# Patient Record
Sex: FEMALE | Race: WHITE | NOT HISPANIC OR LATINO | URBAN - METROPOLITAN AREA
[De-identification: names, ages, dates, MRNs, and addresses within clinical notes are randomized per-mention and may not be internally consistent; named-entity substitution may affect disease eponyms.]

---

## 2019-06-16 ENCOUNTER — INPATIENT (INPATIENT)
Facility: HOSPITAL | Age: 34
LOS: 4 days | Discharge: HOME | End: 2019-06-21
Attending: OBSTETRICS & GYNECOLOGY | Admitting: OBSTETRICS & GYNECOLOGY
Payer: COMMERCIAL

## 2019-06-19 VITALS — WEIGHT: 169.76 LBS | HEIGHT: 63 IN

## 2019-06-19 DIAGNOSIS — O26.899 OTHER SPECIFIED PREGNANCY RELATED CONDITIONS, UNSPECIFIED TRIMESTER: ICD-10-CM

## 2019-06-19 DIAGNOSIS — Z3A.00 WEEKS OF GESTATION OF PREGNANCY NOT SPECIFIED: ICD-10-CM

## 2019-06-19 LAB
ALBUMIN SERPL ELPH-MCNC: 3.3 G/DL — SIGNIFICANT CHANGE UP (ref 3.3–5)
ALP SERPL-CCNC: 172 U/L — HIGH (ref 40–120)
ALT FLD-CCNC: <5 U/L — LOW (ref 10–45)
AMNISURE ROM (RUPTURE OF MEMBRANES): POSITIVE
ANION GAP SERPL CALC-SCNC: 13 MMOL/L — SIGNIFICANT CHANGE UP (ref 5–17)
APPEARANCE UR: ABNORMAL
APTT BLD: 27.7 SEC — SIGNIFICANT CHANGE UP (ref 27.5–36.3)
AST SERPL-CCNC: 19 U/L — SIGNIFICANT CHANGE UP (ref 10–40)
BASOPHILS # BLD AUTO: 0.07 K/UL — SIGNIFICANT CHANGE UP (ref 0–0.2)
BASOPHILS NFR BLD AUTO: 0.5 % — SIGNIFICANT CHANGE UP (ref 0–2)
BILIRUB SERPL-MCNC: 0.2 MG/DL — SIGNIFICANT CHANGE UP (ref 0.2–1.2)
BILIRUB UR-MCNC: NEGATIVE — SIGNIFICANT CHANGE UP
BUN SERPL-MCNC: 6 MG/DL — LOW (ref 7–23)
CALCIUM SERPL-MCNC: 9.6 MG/DL — SIGNIFICANT CHANGE UP (ref 8.4–10.5)
CHLORIDE SERPL-SCNC: 101 MMOL/L — SIGNIFICANT CHANGE UP (ref 96–108)
CO2 SERPL-SCNC: 20 MMOL/L — LOW (ref 22–31)
COLOR SPEC: YELLOW — SIGNIFICANT CHANGE UP
CREAT ?TM UR-MCNC: 159 MG/DL — SIGNIFICANT CHANGE UP
CREAT SERPL-MCNC: 0.56 MG/DL — SIGNIFICANT CHANGE UP (ref 0.5–1.3)
DIFF PNL FLD: ABNORMAL
EOSINOPHIL # BLD AUTO: 0.06 K/UL — SIGNIFICANT CHANGE UP (ref 0–0.5)
EOSINOPHIL NFR BLD AUTO: 0.4 % — SIGNIFICANT CHANGE UP (ref 0–6)
FIBRINOGEN PPP-MCNC: 460 MG/DL — HIGH (ref 258–438)
GLUCOSE SERPL-MCNC: 89 MG/DL — SIGNIFICANT CHANGE UP (ref 70–99)
GLUCOSE UR QL: NEGATIVE — SIGNIFICANT CHANGE UP
HCT VFR BLD CALC: 36.1 % — SIGNIFICANT CHANGE UP (ref 34.5–45)
HGB BLD-MCNC: 12 G/DL — SIGNIFICANT CHANGE UP (ref 11.5–15.5)
IMM GRANULOCYTES NFR BLD AUTO: 1.1 % — SIGNIFICANT CHANGE UP (ref 0–1.5)
INR BLD: 0.98 — SIGNIFICANT CHANGE UP (ref 0.88–1.16)
KETONES UR-MCNC: NEGATIVE — SIGNIFICANT CHANGE UP
LDH SERPL L TO P-CCNC: 161 U/L — SIGNIFICANT CHANGE UP (ref 50–242)
LEUKOCYTE ESTERASE UR-ACNC: NEGATIVE — SIGNIFICANT CHANGE UP
LYMPHOCYTES # BLD AUTO: 17.6 % — SIGNIFICANT CHANGE UP (ref 13–44)
LYMPHOCYTES # BLD AUTO: 2.4 K/UL — SIGNIFICANT CHANGE UP (ref 1–3.3)
MCHC RBC-ENTMCNC: 29.1 PG — SIGNIFICANT CHANGE UP (ref 27–34)
MCHC RBC-ENTMCNC: 33.2 GM/DL — SIGNIFICANT CHANGE UP (ref 32–36)
MCV RBC AUTO: 87.4 FL — SIGNIFICANT CHANGE UP (ref 80–100)
MONOCYTES # BLD AUTO: 0.8 K/UL — SIGNIFICANT CHANGE UP (ref 0–0.9)
MONOCYTES NFR BLD AUTO: 5.9 % — SIGNIFICANT CHANGE UP (ref 2–14)
NEUTROPHILS # BLD AUTO: 10.12 K/UL — HIGH (ref 1.8–7.4)
NEUTROPHILS NFR BLD AUTO: 74.5 % — SIGNIFICANT CHANGE UP (ref 43–77)
NITRITE UR-MCNC: NEGATIVE — SIGNIFICANT CHANGE UP
NRBC # BLD: 0 /100 WBCS — SIGNIFICANT CHANGE UP (ref 0–0)
PH UR: 6 — SIGNIFICANT CHANGE UP (ref 5–8)
PLATELET # BLD AUTO: 212 K/UL — SIGNIFICANT CHANGE UP (ref 150–400)
POTASSIUM SERPL-MCNC: 4.4 MMOL/L — SIGNIFICANT CHANGE UP (ref 3.5–5.3)
POTASSIUM SERPL-SCNC: 4.4 MMOL/L — SIGNIFICANT CHANGE UP (ref 3.5–5.3)
PROT ?TM UR-MCNC: 28 MG/DL — HIGH (ref 0–12)
PROT SERPL-MCNC: 6.6 G/DL — SIGNIFICANT CHANGE UP (ref 6–8.3)
PROT UR-MCNC: ABNORMAL MG/DL
PROT/CREAT UR-RTO: 0.2 RATIO — SIGNIFICANT CHANGE UP (ref 0–0.2)
PROTHROM AB SERPL-ACNC: 11.1 SEC — SIGNIFICANT CHANGE UP (ref 10–12.9)
RBC # BLD: 4.13 M/UL — SIGNIFICANT CHANGE UP (ref 3.8–5.2)
RBC # FLD: 13.2 % — SIGNIFICANT CHANGE UP (ref 10.3–14.5)
SODIUM SERPL-SCNC: 134 MMOL/L — LOW (ref 135–145)
SP GR SPEC: 1.02 — SIGNIFICANT CHANGE UP (ref 1–1.03)
T PALLIDUM AB TITR SER: NEGATIVE — SIGNIFICANT CHANGE UP
URATE SERPL-MCNC: 5.1 MG/DL — SIGNIFICANT CHANGE UP (ref 2.5–7)
UROBILINOGEN FLD QL: 0.2 E.U./DL — SIGNIFICANT CHANGE UP
WBC # BLD: 13.6 K/UL — HIGH (ref 3.8–10.5)
WBC # FLD AUTO: 13.6 K/UL — HIGH (ref 3.8–10.5)

## 2019-06-19 RX ORDER — OXYCODONE HYDROCHLORIDE 5 MG/1
5 TABLET ORAL
Refills: 0 | Status: DISCONTINUED | OUTPATIENT
Start: 2019-06-19 | End: 2019-06-21

## 2019-06-19 RX ORDER — OXYTOCIN 10 UNIT/ML
333.33 VIAL (ML) INJECTION
Qty: 20 | Refills: 0 | Status: DISCONTINUED | OUTPATIENT
Start: 2019-06-19 | End: 2019-06-19

## 2019-06-19 RX ORDER — SIMETHICONE 80 MG/1
80 TABLET, CHEWABLE ORAL EVERY 4 HOURS
Refills: 0 | Status: DISCONTINUED | OUTPATIENT
Start: 2019-06-19 | End: 2019-06-21

## 2019-06-19 RX ORDER — LANOLIN
1 OINTMENT (GRAM) TOPICAL EVERY 6 HOURS
Refills: 0 | Status: DISCONTINUED | OUTPATIENT
Start: 2019-06-19 | End: 2019-06-21

## 2019-06-19 RX ORDER — MAGNESIUM HYDROXIDE 400 MG/1
30 TABLET, CHEWABLE ORAL
Refills: 0 | Status: DISCONTINUED | OUTPATIENT
Start: 2019-06-19 | End: 2019-06-21

## 2019-06-19 RX ORDER — OXYTOCIN 10 UNIT/ML
333.33 VIAL (ML) INJECTION
Qty: 20 | Refills: 0 | Status: DISCONTINUED | OUTPATIENT
Start: 2019-06-19 | End: 2019-06-21

## 2019-06-19 RX ORDER — KETOROLAC TROMETHAMINE 30 MG/ML
30 SYRINGE (ML) INJECTION ONCE
Refills: 0 | Status: DISCONTINUED | OUTPATIENT
Start: 2019-06-19 | End: 2019-06-19

## 2019-06-19 RX ORDER — FENTANYL/BUPIVACAINE/NS/PF 2MCG/ML-.1
250 PLASTIC BAG, INJECTION (ML) INJECTION
Refills: 0 | Status: DISCONTINUED | OUTPATIENT
Start: 2019-06-19 | End: 2019-06-19

## 2019-06-19 RX ORDER — BENZOCAINE 10 %
1 GEL (GRAM) MUCOUS MEMBRANE EVERY 6 HOURS
Refills: 0 | Status: DISCONTINUED | OUTPATIENT
Start: 2019-06-19 | End: 2019-06-21

## 2019-06-19 RX ORDER — PRAMOXINE HYDROCHLORIDE 150 MG/15G
1 AEROSOL, FOAM RECTAL EVERY 4 HOURS
Refills: 0 | Status: DISCONTINUED | OUTPATIENT
Start: 2019-06-19 | End: 2019-06-21

## 2019-06-19 RX ORDER — OXYTOCIN 10 UNIT/ML
1 VIAL (ML) INJECTION
Qty: 30 | Refills: 0 | Status: DISCONTINUED | OUTPATIENT
Start: 2019-06-19 | End: 2019-06-19

## 2019-06-19 RX ORDER — DIBUCAINE 1 %
1 OINTMENT (GRAM) RECTAL EVERY 6 HOURS
Refills: 0 | Status: DISCONTINUED | OUTPATIENT
Start: 2019-06-19 | End: 2019-06-21

## 2019-06-19 RX ORDER — TETANUS TOXOID, REDUCED DIPHTHERIA TOXOID AND ACELLULAR PERTUSSIS VACCINE, ADSORBED 5; 2.5; 8; 8; 2.5 [IU]/.5ML; [IU]/.5ML; UG/.5ML; UG/.5ML; UG/.5ML
0.5 SUSPENSION INTRAMUSCULAR ONCE
Refills: 0 | Status: DISCONTINUED | OUTPATIENT
Start: 2019-06-19 | End: 2019-06-21

## 2019-06-19 RX ORDER — SODIUM CHLORIDE 9 MG/ML
3 INJECTION INTRAMUSCULAR; INTRAVENOUS; SUBCUTANEOUS EVERY 8 HOURS
Refills: 0 | Status: DISCONTINUED | OUTPATIENT
Start: 2019-06-19 | End: 2019-06-21

## 2019-06-19 RX ORDER — GLYCERIN ADULT
1 SUPPOSITORY, RECTAL RECTAL AT BEDTIME
Refills: 0 | Status: DISCONTINUED | OUTPATIENT
Start: 2019-06-19 | End: 2019-06-21

## 2019-06-19 RX ORDER — DOCUSATE SODIUM 100 MG
100 CAPSULE ORAL
Refills: 0 | Status: DISCONTINUED | OUTPATIENT
Start: 2019-06-19 | End: 2019-06-21

## 2019-06-19 RX ORDER — SODIUM CHLORIDE 9 MG/ML
1000 INJECTION, SOLUTION INTRAVENOUS
Refills: 0 | Status: DISCONTINUED | OUTPATIENT
Start: 2019-06-19 | End: 2019-06-19

## 2019-06-19 RX ORDER — HYDROCORTISONE 1 %
1 OINTMENT (GRAM) TOPICAL EVERY 6 HOURS
Refills: 0 | Status: DISCONTINUED | OUTPATIENT
Start: 2019-06-19 | End: 2019-06-21

## 2019-06-19 RX ORDER — ACETAMINOPHEN 500 MG
975 TABLET ORAL
Refills: 0 | Status: DISCONTINUED | OUTPATIENT
Start: 2019-06-19 | End: 2019-06-21

## 2019-06-19 RX ORDER — IBUPROFEN 200 MG
600 TABLET ORAL EVERY 6 HOURS
Refills: 0 | Status: COMPLETED | OUTPATIENT
Start: 2019-06-19 | End: 2020-05-17

## 2019-06-19 RX ORDER — AER TRAVELER 0.5 G/1
1 SOLUTION RECTAL; TOPICAL EVERY 4 HOURS
Refills: 0 | Status: DISCONTINUED | OUTPATIENT
Start: 2019-06-19 | End: 2019-06-21

## 2019-06-19 RX ORDER — ONDANSETRON 8 MG/1
4 TABLET, FILM COATED ORAL EVERY 6 HOURS
Refills: 0 | Status: DISCONTINUED | OUTPATIENT
Start: 2019-06-19 | End: 2019-06-19

## 2019-06-19 RX ORDER — DIPHENHYDRAMINE HCL 50 MG
25 CAPSULE ORAL EVERY 6 HOURS
Refills: 0 | Status: DISCONTINUED | OUTPATIENT
Start: 2019-06-19 | End: 2019-06-21

## 2019-06-19 RX ORDER — DEXAMETHASONE 0.5 MG/5ML
4 ELIXIR ORAL EVERY 6 HOURS
Refills: 0 | Status: DISCONTINUED | OUTPATIENT
Start: 2019-06-19 | End: 2019-06-19

## 2019-06-19 RX ORDER — NALOXONE HYDROCHLORIDE 4 MG/.1ML
0.1 SPRAY NASAL
Refills: 0 | Status: DISCONTINUED | OUTPATIENT
Start: 2019-06-19 | End: 2019-06-19

## 2019-06-19 RX ORDER — OXYCODONE HYDROCHLORIDE 5 MG/1
5 TABLET ORAL ONCE
Refills: 0 | Status: DISCONTINUED | OUTPATIENT
Start: 2019-06-19 | End: 2019-06-21

## 2019-06-19 RX ADMIN — Medication 975 MILLIGRAM(S): at 19:42

## 2019-06-19 RX ADMIN — Medication 30 MILLIGRAM(S): at 19:00

## 2019-06-19 RX ADMIN — SODIUM CHLORIDE 125 MILLILITER(S): 9 INJECTION, SOLUTION INTRAVENOUS at 05:22

## 2019-06-19 RX ADMIN — Medication 975 MILLIGRAM(S): at 16:30

## 2019-06-19 RX ADMIN — Medication 1 MILLIUNIT(S)/MIN: at 05:22

## 2019-06-19 RX ADMIN — Medication 30 MILLIGRAM(S): at 19:41

## 2019-06-19 RX ADMIN — SODIUM CHLORIDE 3 MILLILITER(S): 9 INJECTION INTRAMUSCULAR; INTRAVENOUS; SUBCUTANEOUS at 23:18

## 2019-06-20 LAB
ALBUMIN SERPL ELPH-MCNC: 2.5 G/DL — LOW (ref 3.3–5)
ALP SERPL-CCNC: 132 U/L — HIGH (ref 40–120)
ALT FLD-CCNC: 5 U/L — LOW (ref 10–45)
ANION GAP SERPL CALC-SCNC: 10 MMOL/L — SIGNIFICANT CHANGE UP (ref 5–17)
AST SERPL-CCNC: 34 U/L — SIGNIFICANT CHANGE UP (ref 10–40)
BILIRUB SERPL-MCNC: 0.2 MG/DL — SIGNIFICANT CHANGE UP (ref 0.2–1.2)
BUN SERPL-MCNC: 9 MG/DL — SIGNIFICANT CHANGE UP (ref 7–23)
CALCIUM SERPL-MCNC: 8.2 MG/DL — LOW (ref 8.4–10.5)
CHLORIDE SERPL-SCNC: 101 MMOL/L — SIGNIFICANT CHANGE UP (ref 96–108)
CO2 SERPL-SCNC: 22 MMOL/L — SIGNIFICANT CHANGE UP (ref 22–31)
CREAT SERPL-MCNC: 0.71 MG/DL — SIGNIFICANT CHANGE UP (ref 0.5–1.3)
GLUCOSE SERPL-MCNC: 84 MG/DL — SIGNIFICANT CHANGE UP (ref 70–99)
HCT VFR BLD CALC: 25.6 % — LOW (ref 34.5–45)
HGB BLD-MCNC: 8.6 G/DL — LOW (ref 11.5–15.5)
MCHC RBC-ENTMCNC: 29.4 PG — SIGNIFICANT CHANGE UP (ref 27–34)
MCHC RBC-ENTMCNC: 33.6 GM/DL — SIGNIFICANT CHANGE UP (ref 32–36)
MCV RBC AUTO: 87.4 FL — SIGNIFICANT CHANGE UP (ref 80–100)
NRBC # BLD: 0 /100 WBCS — SIGNIFICANT CHANGE UP (ref 0–0)
PLATELET # BLD AUTO: 162 K/UL — SIGNIFICANT CHANGE UP (ref 150–400)
POTASSIUM SERPL-MCNC: 4.2 MMOL/L — SIGNIFICANT CHANGE UP (ref 3.5–5.3)
POTASSIUM SERPL-SCNC: 4.2 MMOL/L — SIGNIFICANT CHANGE UP (ref 3.5–5.3)
PROT SERPL-MCNC: 5.1 G/DL — LOW (ref 6–8.3)
RBC # BLD: 2.93 M/UL — LOW (ref 3.8–5.2)
RBC # FLD: 13.1 % — SIGNIFICANT CHANGE UP (ref 10.3–14.5)
SODIUM SERPL-SCNC: 133 MMOL/L — LOW (ref 135–145)
WBC # BLD: 16.19 K/UL — HIGH (ref 3.8–10.5)
WBC # FLD AUTO: 16.19 K/UL — HIGH (ref 3.8–10.5)

## 2019-06-20 RX ORDER — IBUPROFEN 200 MG
600 TABLET ORAL EVERY 6 HOURS
Refills: 0 | Status: DISCONTINUED | OUTPATIENT
Start: 2019-06-20 | End: 2019-06-21

## 2019-06-20 RX ORDER — BENZOYL PEROXIDE MICRONIZED 5.8 %
0 TOWELETTE (EA) TOPICAL
Qty: 0 | Refills: 0 | DISCHARGE

## 2019-06-20 RX ADMIN — SODIUM CHLORIDE 3 MILLILITER(S): 9 INJECTION INTRAMUSCULAR; INTRAVENOUS; SUBCUTANEOUS at 14:08

## 2019-06-20 RX ADMIN — Medication 600 MILLIGRAM(S): at 06:51

## 2019-06-20 RX ADMIN — Medication 25 MILLIGRAM(S): at 00:02

## 2019-06-20 RX ADMIN — Medication 600 MILLIGRAM(S): at 11:47

## 2019-06-20 RX ADMIN — Medication 600 MILLIGRAM(S): at 12:40

## 2019-06-20 RX ADMIN — Medication 1 TABLET(S): at 11:47

## 2019-06-20 RX ADMIN — SODIUM CHLORIDE 3 MILLILITER(S): 9 INJECTION INTRAMUSCULAR; INTRAVENOUS; SUBCUTANEOUS at 21:06

## 2019-06-20 RX ADMIN — Medication 1 SPRAY(S): at 05:52

## 2019-06-20 RX ADMIN — Medication 975 MILLIGRAM(S): at 00:35

## 2019-06-20 RX ADMIN — Medication 100 MILLIGRAM(S): at 11:47

## 2019-06-20 RX ADMIN — Medication 600 MILLIGRAM(S): at 17:02

## 2019-06-20 RX ADMIN — Medication 975 MILLIGRAM(S): at 00:02

## 2019-06-20 RX ADMIN — Medication 1 APPLICATION(S): at 05:52

## 2019-06-20 RX ADMIN — Medication 1 APPLICATION(S): at 05:53

## 2019-06-20 RX ADMIN — Medication 975 MILLIGRAM(S): at 06:30

## 2019-06-20 RX ADMIN — SODIUM CHLORIDE 3 MILLILITER(S): 9 INJECTION INTRAMUSCULAR; INTRAVENOUS; SUBCUTANEOUS at 06:42

## 2019-06-20 RX ADMIN — Medication 975 MILLIGRAM(S): at 05:53

## 2019-06-20 RX ADMIN — Medication 600 MILLIGRAM(S): at 07:35

## 2019-06-20 RX ADMIN — Medication 600 MILLIGRAM(S): at 18:02

## 2019-06-20 NOTE — PROGRESS NOTE ADULT - SUBJECTIVE AND OBJECTIVE BOX
Patient evaluated at bedside.   She reports pain is well controlled.    She has been ambulating without assistance, voiding spontaneously, and is breastfeeding.    She denies HA, changes in vision, dizziness, chest pain, palpitations, shortness of breathe, n/v, heavy vaginal bleeding or perineal discomfort.    Physical Exam:  Vital Signs Last 24 Hrs  T(C): 37.3 (20 Jun 2019 04:01), Max: 37.9 (19 Jun 2019 19:45)  T(F): 99.2 (20 Jun 2019 04:01), Max: 100.3 (19 Jun 2019 19:45)  HR: 95 (20 Jun 2019 02:00) (88 - 113)  BP: 123/82 (20 Jun 2019 02:00) (109/75 - 140/77)  BP(mean): 87 (19 Jun 2019 20:45) (87 - 87)  RR: 18 (20 Jun 2019 02:00) (17 - 19)  SpO2: 96% (20 Jun 2019 02:00) (96% - 99%)    GA: NAD, A+0 x 3  Abd: + BS, soft, nontender, nondistended, no rebound or guarding, uterus firm at midline  : lochia WNL  Extremities: no swelling or calf tenderness                          8.6    16.19 )-----------( 162      ( 20 Jun 2019 05:45 )             25.6     06-20    133<L>  |  101  |  9   ----------------------------<  84  4.2   |  22  |  0.71    Ca    8.2<L>      20 Jun 2019 05:44    TPro  5.1<L>  /  Alb  2.5<L>  /  TBili  0.2  /  DBili  x   /  AST  34  /  ALT  5<L>  /  AlkPhos  132<H>  06-20    PT/INR - ( 19 Jun 2019 05:35 )   PT: 11.1 sec;   INR: 0.98          PTT - ( 19 Jun 2019 05:35 )  PTT:27.7 sec

## 2019-06-20 NOTE — DISCHARGE NOTE OB - CARE PROVIDER_API CALL
Layla Barrientos)  Obstetrics and Gynecology  [address], [address]  [place] [address]  Phone: [phone]  Fax: [phone]  Follow Up Time:

## 2019-06-20 NOTE — DISCHARGE NOTE OB - PATIENT PORTAL LINK FT
You can access the [facility] Patient Portal, offered by [facility], by registering with the following website: [email or web address]

## 2019-06-20 NOTE — LACTATION INITIAL EVALUATION - NS LACT CON REASON FOR REQ
39 week gestation baby boy, seen a little less than 24 hrs of life. Baby has been voiding and stooling, but baby was supplemented with formula as he had not yet latched to breast despite attempts. Mother had a PPH s/p c/s delivery and was  from the baby for a few hours. Implications of this were explained in terms of potential for delayed onset of lactogenesis stage 2. Hand expression was taught to the mother with proper return demo. Colostrum expressible in very small amounts, and drops offered to baby. Mother with a history of bilateral breast reduction surgery with keyhole incision, performed about 10 years ago, and mother confirms good sensation to the nipple and areola, and confirms breast changes noted in pregnancy. Implications of this type of surgery were explained to the mother, and mother is aware of possibility of insufficient milk supply, but this is to be determined as milk 'comes in' more fully after a couple of weeks. Reassurance and support provided. Breast pump to be brought and set up at bedside and instructions for use to be taught by bedside RN. Milk production feedback system explained and mother to double pump or hand express for ~15 minutes after every breastfeed session for stimulation and supplementation purposes. 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 Implications of this also explained to the mother, support provided. She will speak with pediatrician for further advice and recommendations as needed. Baby placed skin to skin and positioning and latch strategies were taught to the mother. Baby initially very sleepy and uninterested in feeding. After some stimulation efforts, baby deeply latched to the right breast and continues to feed effectively for 20+ minutes. Mother confirms a strong pull of the nipple and denies pinching. Continued SSC and rooming-in were encouraged. All questions were answered, mother verbalized understanding of teaching and info given./primaparous mom

## 2019-06-20 NOTE — PROGRESS NOTE ADULT - ASSESSMENT
A/P yo 34y  s/p FAVD, PP#1 ,  stable, am CBC stable 8.6    gHTN: normotensive to mild range overnight,. will continue to monitor   1. Pain: OPM  2. GI: Reg  3. :  Voiding  4. DVT prophylaxis: SCDs, ambulation  5. Dispo: PP#2

## 2019-06-21 VITALS
DIASTOLIC BLOOD PRESSURE: 79 MMHG | TEMPERATURE: 99 F | HEART RATE: 78 BPM | SYSTOLIC BLOOD PRESSURE: 118 MMHG | RESPIRATION RATE: 18 BRPM | OXYGEN SATURATION: 98 %

## 2019-06-21 PROCEDURE — 99214 OFFICE O/P EST MOD 30 MIN: CPT

## 2019-06-21 PROCEDURE — 84112 EVAL AMNIOTIC FLUID PROTEIN: CPT

## 2019-06-21 PROCEDURE — 85384 FIBRINOGEN ACTIVITY: CPT

## 2019-06-21 PROCEDURE — 84156 ASSAY OF PROTEIN URINE: CPT

## 2019-06-21 PROCEDURE — 85027 COMPLETE CBC AUTOMATED: CPT

## 2019-06-21 PROCEDURE — 36415 COLL VENOUS BLD VENIPUNCTURE: CPT

## 2019-06-21 PROCEDURE — 85025 COMPLETE CBC W/AUTO DIFF WBC: CPT

## 2019-06-21 PROCEDURE — 86900 BLOOD TYPING SEROLOGIC ABO: CPT

## 2019-06-21 PROCEDURE — 86901 BLOOD TYPING SEROLOGIC RH(D): CPT

## 2019-06-21 PROCEDURE — 82570 ASSAY OF URINE CREATININE: CPT

## 2019-06-21 PROCEDURE — 86780 TREPONEMA PALLIDUM: CPT

## 2019-06-21 PROCEDURE — 80053 COMPREHEN METABOLIC PANEL: CPT

## 2019-06-21 PROCEDURE — 85610 PROTHROMBIN TIME: CPT

## 2019-06-21 PROCEDURE — 86850 RBC ANTIBODY SCREEN: CPT

## 2019-06-21 PROCEDURE — 81001 URINALYSIS AUTO W/SCOPE: CPT

## 2019-06-21 PROCEDURE — 84550 ASSAY OF BLOOD/URIC ACID: CPT

## 2019-06-21 PROCEDURE — 83615 LACTATE (LD) (LDH) ENZYME: CPT

## 2019-06-21 PROCEDURE — 85730 THROMBOPLASTIN TIME PARTIAL: CPT

## 2019-06-21 RX ADMIN — Medication 1 TABLET(S): at 11:55

## 2019-06-21 RX ADMIN — Medication 600 MILLIGRAM(S): at 00:50

## 2019-06-21 RX ADMIN — Medication 600 MILLIGRAM(S): at 07:36

## 2019-06-21 RX ADMIN — Medication 975 MILLIGRAM(S): at 08:47

## 2019-06-21 RX ADMIN — SODIUM CHLORIDE 3 MILLILITER(S): 9 INJECTION INTRAMUSCULAR; INTRAVENOUS; SUBCUTANEOUS at 04:52

## 2019-06-21 RX ADMIN — Medication 975 MILLIGRAM(S): at 09:40

## 2019-06-21 RX ADMIN — Medication 600 MILLIGRAM(S): at 00:10

## 2019-06-21 RX ADMIN — Medication 600 MILLIGRAM(S): at 06:49

## 2019-06-21 RX ADMIN — Medication 100 MILLIGRAM(S): at 00:10

## 2019-06-21 RX ADMIN — Medication 600 MILLIGRAM(S): at 11:55

## 2019-06-21 NOTE — PROGRESS NOTE ADULT - ASSESSMENT
A/P yo 34y  s/p FAVD , PP#2 , stable    1. cHTN  -bp's stable  2. EBL 500cc  -s/p cytotec  -bleeding stable   1. Pain: OPM  2. GI: Reg  3. :  Voiding  4. DVT prophylaxis: SCDs, ambulation  5. Dispo: PP#2

## 2019-06-21 NOTE — PROGRESS NOTE ADULT - ATTENDING COMMENTS
Patient seen and examined, agree with assessment and plan.  PPD s/p FAD, doing well, cleared for discharge home today.    Dr. Nieves, Attending

## 2019-06-21 NOTE — PROGRESS NOTE ADULT - SUBJECTIVE AND OBJECTIVE BOX
Patient evaluated at bedside.     She reports pain is well controlled.    She has been ambulating without assistance, voiding spontaneously, and is breastfeeding.    She denies HA, dizziness, chest pain, palpitations, shortness of breath, n/v, heavy vaginal bleeding or perineal discomfort.    Physical Exam:  Vital Signs Last 24 Hrs  T(C): 37 (21 Jun 2019 06:00), Max: 37 (20 Jun 2019 09:30)  T(F): 98.6 (21 Jun 2019 06:00), Max: 98.6 (20 Jun 2019 09:30)  HR: 95 (21 Jun 2019 06:00) (95 - 111)  BP: 113/76 (21 Jun 2019 06:00) (110/75 - 126/81)  BP(mean): --  RR: 18 (21 Jun 2019 06:00) (14 - 19)  SpO2: 98% (21 Jun 2019 06:00) (96% - 99%)    GA: NAD, A+0 x 3  Abd: + BS, soft, nontender, nondistended, no rebound or guarding, uterus firm at midline  : lochia WNL  Extremities: no edema or calf tenderness                          8.6    16.19 )-----------( 162      ( 20 Jun 2019 05:45 )             25.6     06-20    133<L>  |  101  |  9   ----------------------------<  84  4.2   |  22  |  0.71    Ca    8.2<L>      20 Jun 2019 05:44    TPro  5.1<L>  /  Alb  2.5<L>  /  TBili  0.2  /  DBili  x   /  AST  34  /  ALT  5<L>  /  AlkPhos  132<H>  06-20

## 2019-06-26 DIAGNOSIS — O42.92 FULL-TERM PREMATURE RUPTURE OF MEMBRANES, UNSPECIFIED AS TO LENGTH OF TIME BETWEEN RUPTURE AND ONSET OF LABOR: ICD-10-CM

## 2019-06-26 DIAGNOSIS — Z3A.39 39 WEEKS GESTATION OF PREGNANCY: ICD-10-CM

## 2021-05-20 ENCOUNTER — INPATIENT (INPATIENT)
Facility: HOSPITAL | Age: 36
LOS: 2 days | Discharge: HOME | End: 2021-05-23
Attending: OBSTETRICS & GYNECOLOGY | Admitting: OBSTETRICS & GYNECOLOGY
Payer: COMMERCIAL

## 2021-05-20 DIAGNOSIS — O26.899 OTHER SPECIFIED PREGNANCY RELATED CONDITIONS, UNSPECIFIED TRIMESTER: ICD-10-CM

## 2021-05-20 DIAGNOSIS — Z3A.00 WEEKS OF GESTATION OF PREGNANCY NOT SPECIFIED: ICD-10-CM

## 2021-05-20 LAB
BASOPHILS # BLD AUTO: 0.05 K/UL — SIGNIFICANT CHANGE UP (ref 0–0.2)
BASOPHILS NFR BLD AUTO: 0.4 % — SIGNIFICANT CHANGE UP (ref 0–2)
EOSINOPHIL # BLD AUTO: 0.06 K/UL — SIGNIFICANT CHANGE UP (ref 0–0.5)
EOSINOPHIL NFR BLD AUTO: 0.5 % — SIGNIFICANT CHANGE UP (ref 0–6)
HCT VFR BLD CALC: 33.7 % — LOW (ref 34.5–45)
HGB BLD-MCNC: 11.4 G/DL — LOW (ref 11.5–15.5)
IMM GRANULOCYTES NFR BLD AUTO: 1.5 % — SIGNIFICANT CHANGE UP (ref 0–1.5)
LYMPHOCYTES # BLD AUTO: 18.5 % — SIGNIFICANT CHANGE UP (ref 13–44)
LYMPHOCYTES # BLD AUTO: 2.15 K/UL — SIGNIFICANT CHANGE UP (ref 1–3.3)
MCHC RBC-ENTMCNC: 28.8 PG — SIGNIFICANT CHANGE UP (ref 27–34)
MCHC RBC-ENTMCNC: 33.8 GM/DL — SIGNIFICANT CHANGE UP (ref 32–36)
MCV RBC AUTO: 85.1 FL — SIGNIFICANT CHANGE UP (ref 80–100)
MONOCYTES # BLD AUTO: 0.74 K/UL — SIGNIFICANT CHANGE UP (ref 0–0.9)
MONOCYTES NFR BLD AUTO: 6.4 % — SIGNIFICANT CHANGE UP (ref 2–14)
NEUTROPHILS # BLD AUTO: 8.46 K/UL — HIGH (ref 1.8–7.4)
NEUTROPHILS NFR BLD AUTO: 72.7 % — SIGNIFICANT CHANGE UP (ref 43–77)
NRBC # BLD: 0 /100 WBCS — SIGNIFICANT CHANGE UP (ref 0–0)
PLATELET # BLD AUTO: 211 K/UL — SIGNIFICANT CHANGE UP (ref 150–400)
RBC # BLD: 3.96 M/UL — SIGNIFICANT CHANGE UP (ref 3.8–5.2)
RBC # FLD: 14 % — SIGNIFICANT CHANGE UP (ref 10.3–14.5)
WBC # BLD: 11.64 K/UL — HIGH (ref 3.8–10.5)
WBC # FLD AUTO: 11.64 K/UL — HIGH (ref 3.8–10.5)

## 2021-05-20 RX ORDER — OXYTOCIN 10 UNIT/ML
2 VIAL (ML) INJECTION
Qty: 30 | Refills: 0 | Status: DISCONTINUED | OUTPATIENT
Start: 2021-05-20 | End: 2021-05-21

## 2021-05-20 RX ORDER — OXYTOCIN 10 UNIT/ML
333.33 VIAL (ML) INJECTION
Qty: 20 | Refills: 0 | Status: DISCONTINUED | OUTPATIENT
Start: 2021-05-20 | End: 2021-05-21

## 2021-05-20 RX ORDER — SODIUM CHLORIDE 9 MG/ML
1000 INJECTION, SOLUTION INTRAVENOUS
Refills: 0 | Status: DISCONTINUED | OUTPATIENT
Start: 2021-05-20 | End: 2021-05-21

## 2021-05-20 RX ORDER — CITRIC ACID/SODIUM CITRATE 300-500 MG
15 SOLUTION, ORAL ORAL ONCE
Refills: 0 | Status: DISCONTINUED | OUTPATIENT
Start: 2021-05-20 | End: 2021-05-21

## 2021-05-21 VITALS — WEIGHT: 154.32 LBS | HEIGHT: 63 IN

## 2021-05-21 LAB
BLD GP AB SCN SERPL QL: NEGATIVE — SIGNIFICANT CHANGE UP
COVID-19 SPIKE DOMAIN AB INTERP: POSITIVE
COVID-19 SPIKE DOMAIN ANTIBODY RESULT: >250 U/ML — HIGH
RH IG SCN BLD-IMP: POSITIVE — SIGNIFICANT CHANGE UP
RH IG SCN BLD-IMP: POSITIVE — SIGNIFICANT CHANGE UP
SARS-COV-2 IGG+IGM SERPL QL IA: >250 U/ML — HIGH
SARS-COV-2 IGG+IGM SERPL QL IA: POSITIVE
T PALLIDUM AB TITR SER: NEGATIVE — SIGNIFICANT CHANGE UP

## 2021-05-21 PROCEDURE — 88307 TISSUE EXAM BY PATHOLOGIST: CPT | Mod: 26

## 2021-05-21 RX ORDER — FENTANYL/BUPIVACAINE/NS/PF 2MCG/ML-.1
250 PLASTIC BAG, INJECTION (ML) INJECTION
Refills: 0 | Status: DISCONTINUED | OUTPATIENT
Start: 2021-05-21 | End: 2021-05-21

## 2021-05-21 RX ORDER — IBUPROFEN 200 MG
600 TABLET ORAL EVERY 6 HOURS
Refills: 0 | Status: DISCONTINUED | OUTPATIENT
Start: 2021-05-21 | End: 2021-05-23

## 2021-05-21 RX ORDER — OXYTOCIN 10 UNIT/ML
6 VIAL (ML) INJECTION
Qty: 30 | Refills: 0 | Status: DISCONTINUED | OUTPATIENT
Start: 2021-05-21 | End: 2021-05-21

## 2021-05-21 RX ORDER — TETANUS TOXOID, REDUCED DIPHTHERIA TOXOID AND ACELLULAR PERTUSSIS VACCINE, ADSORBED 5; 2.5; 8; 8; 2.5 [IU]/.5ML; [IU]/.5ML; UG/.5ML; UG/.5ML; UG/.5ML
0.5 SUSPENSION INTRAMUSCULAR ONCE
Refills: 0 | Status: DISCONTINUED | OUTPATIENT
Start: 2021-05-21 | End: 2021-05-23

## 2021-05-21 RX ORDER — IBUPROFEN 200 MG
600 TABLET ORAL EVERY 6 HOURS
Refills: 0 | Status: COMPLETED | OUTPATIENT
Start: 2021-05-21 | End: 2022-04-19

## 2021-05-21 RX ORDER — ACETAMINOPHEN 500 MG
975 TABLET ORAL
Refills: 0 | Status: DISCONTINUED | OUTPATIENT
Start: 2021-05-21 | End: 2021-05-23

## 2021-05-21 RX ORDER — SIMETHICONE 80 MG/1
80 TABLET, CHEWABLE ORAL EVERY 4 HOURS
Refills: 0 | Status: DISCONTINUED | OUTPATIENT
Start: 2021-05-21 | End: 2021-05-23

## 2021-05-21 RX ORDER — DIPHENHYDRAMINE HCL 50 MG
25 CAPSULE ORAL EVERY 6 HOURS
Refills: 0 | Status: DISCONTINUED | OUTPATIENT
Start: 2021-05-21 | End: 2021-05-23

## 2021-05-21 RX ORDER — OXYCODONE HYDROCHLORIDE 5 MG/1
5 TABLET ORAL ONCE
Refills: 0 | Status: DISCONTINUED | OUTPATIENT
Start: 2021-05-21 | End: 2021-05-23

## 2021-05-21 RX ORDER — SODIUM CHLORIDE 9 MG/ML
3 INJECTION INTRAMUSCULAR; INTRAVENOUS; SUBCUTANEOUS EVERY 8 HOURS
Refills: 0 | Status: DISCONTINUED | OUTPATIENT
Start: 2021-05-21 | End: 2021-05-22

## 2021-05-21 RX ORDER — HYDROCORTISONE 1 %
1 OINTMENT (GRAM) TOPICAL EVERY 6 HOURS
Refills: 0 | Status: DISCONTINUED | OUTPATIENT
Start: 2021-05-21 | End: 2021-05-23

## 2021-05-21 RX ORDER — OXYTOCIN 10 UNIT/ML
333.33 VIAL (ML) INJECTION
Qty: 20 | Refills: 0 | Status: DISCONTINUED | OUTPATIENT
Start: 2021-05-21 | End: 2021-05-23

## 2021-05-21 RX ORDER — DIBUCAINE 1 %
1 OINTMENT (GRAM) RECTAL EVERY 6 HOURS
Refills: 0 | Status: DISCONTINUED | OUTPATIENT
Start: 2021-05-21 | End: 2021-05-23

## 2021-05-21 RX ORDER — LANOLIN
1 OINTMENT (GRAM) TOPICAL EVERY 6 HOURS
Refills: 0 | Status: DISCONTINUED | OUTPATIENT
Start: 2021-05-21 | End: 2021-05-23

## 2021-05-21 RX ORDER — MAGNESIUM HYDROXIDE 400 MG/1
30 TABLET, CHEWABLE ORAL
Refills: 0 | Status: DISCONTINUED | OUTPATIENT
Start: 2021-05-21 | End: 2021-05-23

## 2021-05-21 RX ORDER — KETOROLAC TROMETHAMINE 30 MG/ML
30 SYRINGE (ML) INJECTION ONCE
Refills: 0 | Status: DISCONTINUED | OUTPATIENT
Start: 2021-05-21 | End: 2021-05-21

## 2021-05-21 RX ORDER — PRAMOXINE HYDROCHLORIDE 150 MG/15G
1 AEROSOL, FOAM RECTAL EVERY 4 HOURS
Refills: 0 | Status: DISCONTINUED | OUTPATIENT
Start: 2021-05-21 | End: 2021-05-23

## 2021-05-21 RX ORDER — OXYCODONE HYDROCHLORIDE 5 MG/1
5 TABLET ORAL
Refills: 0 | Status: DISCONTINUED | OUTPATIENT
Start: 2021-05-21 | End: 2021-05-23

## 2021-05-21 RX ORDER — BENZOCAINE 10 %
1 GEL (GRAM) MUCOUS MEMBRANE EVERY 6 HOURS
Refills: 0 | Status: DISCONTINUED | OUTPATIENT
Start: 2021-05-21 | End: 2021-05-23

## 2021-05-21 RX ORDER — AER TRAVELER 0.5 G/1
1 SOLUTION RECTAL; TOPICAL EVERY 4 HOURS
Refills: 0 | Status: DISCONTINUED | OUTPATIENT
Start: 2021-05-21 | End: 2021-05-23

## 2021-05-21 RX ADMIN — Medication 600 MILLIGRAM(S): at 21:40

## 2021-05-21 RX ADMIN — Medication 2 MILLIUNIT(S)/MIN: at 01:23

## 2021-05-21 RX ADMIN — Medication 1000 MILLIUNIT(S)/MIN: at 08:49

## 2021-05-21 RX ADMIN — Medication 975 MILLIGRAM(S): at 17:28

## 2021-05-21 RX ADMIN — Medication 1 TABLET(S): at 12:04

## 2021-05-21 RX ADMIN — Medication 600 MILLIGRAM(S): at 20:40

## 2021-05-21 RX ADMIN — SODIUM CHLORIDE 125 MILLILITER(S): 9 INJECTION, SOLUTION INTRAVENOUS at 03:00

## 2021-05-21 RX ADMIN — SODIUM CHLORIDE 125 MILLILITER(S): 9 INJECTION, SOLUTION INTRAVENOUS at 00:00

## 2021-05-21 RX ADMIN — SODIUM CHLORIDE 3 MILLILITER(S): 9 INJECTION INTRAMUSCULAR; INTRAVENOUS; SUBCUTANEOUS at 14:27

## 2021-05-21 RX ADMIN — Medication 975 MILLIGRAM(S): at 13:15

## 2021-05-21 RX ADMIN — Medication 30 MILLIGRAM(S): at 09:30

## 2021-05-21 RX ADMIN — Medication 975 MILLIGRAM(S): at 12:15

## 2021-05-21 RX ADMIN — Medication 975 MILLIGRAM(S): at 18:00

## 2021-05-21 NOTE — PATIENT PROFILE OB - AS SC BRADEN MOBILITY
Worse past 2 wks, reassured no radicular symptoms on exam, increase Lyrica as discussed, con't Cymbalta, stop Flexeril d/t lack of benefit and try Skelaxin, referral to Pain mgt given, red flag symptoms reviewed - call asap if occur, re-eval in 6 wks, if not better T/C MRI of lumbar spine (4) no limitation

## 2021-05-21 NOTE — CHART NOTE - NSCHARTNOTEFT_GEN_A_CORE
Called to bedside for increased vaginal bleeding when patient attempted to void.  Patient laying in bed.  Per, RN, patient had a boggy uterus and aguayo catheter was placed.  UOP of 500cc clear urine.    Abd: uterus firm  : no obvious vaginal bleeding    Plan to leave aguayo in place until 5/22 AM    Dr. Nieves at bedside

## 2021-05-21 NOTE — PROVIDER CONTACT NOTE (CHANGE IN STATUS NOTIFICATION) - SITUATION
patient  DDay. Fundus boggy on admission. lochia moderate and trickling. uterus massaged.  pts vitals stable /70 HR 74.

## 2021-05-21 NOTE — PATIENT PROFILE OB - CAREGIVER ADDRESS
Initial Anesthesia Post-op Note    Patient: Connie Willams  Procedure(s) Performed: ARTHROPLASTY, SHOULDER, TOTAL, REVERSE - LEFT  Anesthesia type: General    Vitals Value Taken Time   Temp 97.8 10/21/20 1410   Pulse 68 10/21/20 1409   Resp 16 10/21/20 1410   SpO2 100 % 10/21/20 1409   /77 10/21/20 1410   Vitals shown include unvalidated device data.      Patient Location: PACU Phase 1  Post-op Vital Signs:stable  Level of Consciousness: sedated  Respiratory Status: spontaneous ventilation, oral airway and face mask  Cardiovascular stable  Hydration: euvolemic  Pain Management: well controlled  Handoff: Handoff to receiving clinician was performed and questions were answered Nausea: None  Airway Patency:oral airway  Post-op Assessment: no complications and patient tolerated procedure well with no complications      No complications documented.   same as patient

## 2021-05-22 RX ORDER — IBUPROFEN 200 MG
1 TABLET ORAL
Qty: 0 | Refills: 0 | DISCHARGE
Start: 2021-05-22

## 2021-05-22 RX ORDER — ACETAMINOPHEN 500 MG
3 TABLET ORAL
Qty: 0 | Refills: 0 | DISCHARGE
Start: 2021-05-22

## 2021-05-22 RX ADMIN — Medication 975 MILLIGRAM(S): at 12:21

## 2021-05-22 RX ADMIN — Medication 600 MILLIGRAM(S): at 15:24

## 2021-05-22 RX ADMIN — Medication 600 MILLIGRAM(S): at 21:40

## 2021-05-22 RX ADMIN — Medication 975 MILLIGRAM(S): at 18:18

## 2021-05-22 RX ADMIN — Medication 975 MILLIGRAM(S): at 13:00

## 2021-05-22 RX ADMIN — Medication 600 MILLIGRAM(S): at 02:13

## 2021-05-22 RX ADMIN — Medication 600 MILLIGRAM(S): at 20:39

## 2021-05-22 RX ADMIN — Medication 600 MILLIGRAM(S): at 09:36

## 2021-05-22 RX ADMIN — Medication 1 TABLET(S): at 12:20

## 2021-05-22 RX ADMIN — Medication 600 MILLIGRAM(S): at 10:30

## 2021-05-22 RX ADMIN — Medication 975 MILLIGRAM(S): at 19:07

## 2021-05-22 RX ADMIN — Medication 600 MILLIGRAM(S): at 16:10

## 2021-05-22 RX ADMIN — Medication 975 MILLIGRAM(S): at 07:00

## 2021-05-22 RX ADMIN — Medication 600 MILLIGRAM(S): at 03:15

## 2021-05-22 RX ADMIN — Medication 975 MILLIGRAM(S): at 05:58

## 2021-05-22 NOTE — DISCHARGE NOTE OB - CARE PROVIDER_API CALL
ROXY STEPHENSON  Obstetrics and Gynecology  [address]  [place] [address]  Phone: [phone]  Fax: [phone]  Follow Up Time:

## 2021-05-22 NOTE — PROGRESS NOTE ADULT - ASSESSMENT
Assessment/Plan    36y y.o. F now PPD#1 s/p . AfVSS. Patient is progressing toward all postpartum milestones. Pain is well-controlled on PO medications. Anticipate discharge today or tomorrow.    1. Pain  - Well-controlled on Motrin and Tylenol ATC    2. GI  - Tolerating regular diet without n/v  - Senna PRN    3.   - Andrew out this AM  - F/u TOV    4. DVT prophylaxis  - Encouraging ambulation    5. Dispo  - Anticipate dispo PPD#1        Angella López MD PGY1

## 2021-05-22 NOTE — LACTATION INITIAL EVALUATION - REQUESTED BY
36.3 wks maternal history of bilateral breast reduction . Mother states she gave EBM for 4-5 months with her previous child and reports that she was never able to latch her first baby,. She states that she pumped because she never produced any breast milk until about 10 days post delivery.. mOther states infant is not latching well to the right breast and hasn't latch onto the left breast at all. She is mostly formula feeding and has no expressible colostrum bilaterally.Infant is currently out of the room for car seat testing. Mother states she will begin pumping today q 3 hours including overnight until infantis effectively latching and lactogenesis II occurs.  Pt prefers to use her personal breast pump when offered to provide her with a hospital grade double electric pump. All questions answered and pt verbalized understanding instructions. Pt is aware of LC availability as needed. Follow up prn. primary RN updated on pt status./patient/staff

## 2021-05-22 NOTE — DISCHARGE NOTE OB - PATIENT PORTAL LINK FT
You can access the FollowMyHealth Patient Portal offered by [facility] by registering at the following website: [email or web address]. By joining [employer]’s FollowMyHealth portal, you will also be able to view your health information using other applications (apps) compatible with our system.

## 2021-05-22 NOTE — PROGRESS NOTE ADULT - SUBJECTIVE AND OBJECTIVE BOX
Patient is a 36y y.o. F now PPD #1 s/p .    NAEO. Patient was evaluated at bedside this AM. Pain is well-controlled with PO pain medications. She has been ambulating without difficulty. Andrew is in place. She endorses decreasing vaginal bleeding.    Vital Signs Last 24 Hrs  T(C): 36.7 (22 May 2021 05:59), Max: 36.8 (21 May 2021 18:00)  T(F): 98 (22 May 2021 05:59), Max: 98.2 (21 May 2021 18:00)  HR: 80 (22 May 2021 05:59) (74 - 99)  BP: 114/69 (22 May 2021 05:59) (100/71 - 116/79)  BP(mean): --  RR: 17 (22 May 2021 05:59) (17 - 18)  SpO2: 98% (22 May 2021 05:59) (96% - 100%)    Physical Exam  Gen: Well-appearing. No acute distress. Resting comfortably in bed.  Resp: Breathing comfortably on RA.  Abd: Soft, non-tender, non-distended. Uterus firm at umbilicus.  Extremities: No calf tenderness.    Labs                          11.4   11.64 )-----------( 211      ( 20 May 2021 22:40 )             33.7                   21 @ 07:01  -  21 @ 07:00  --------------------------------------------------------  IN: 1125 mL / OUT: 2775 mL / NET: -1650 mL

## 2021-05-23 VITALS
RESPIRATION RATE: 16 BRPM | TEMPERATURE: 98 F | OXYGEN SATURATION: 96 % | SYSTOLIC BLOOD PRESSURE: 121 MMHG | HEART RATE: 79 BPM | DIASTOLIC BLOOD PRESSURE: 79 MMHG

## 2021-05-23 PROCEDURE — 85025 COMPLETE CBC W/AUTO DIFF WBC: CPT

## 2021-05-23 PROCEDURE — 86900 BLOOD TYPING SEROLOGIC ABO: CPT

## 2021-05-23 PROCEDURE — 86850 RBC ANTIBODY SCREEN: CPT

## 2021-05-23 PROCEDURE — 99214 OFFICE O/P EST MOD 30 MIN: CPT

## 2021-05-23 PROCEDURE — 86780 TREPONEMA PALLIDUM: CPT

## 2021-05-23 PROCEDURE — 86901 BLOOD TYPING SEROLOGIC RH(D): CPT

## 2021-05-23 PROCEDURE — 88307 TISSUE EXAM BY PATHOLOGIST: CPT

## 2021-05-23 PROCEDURE — 86769 SARS-COV-2 COVID-19 ANTIBODY: CPT

## 2021-05-23 PROCEDURE — 59050 FETAL MONITOR W/REPORT: CPT

## 2021-05-23 PROCEDURE — 36415 COLL VENOUS BLD VENIPUNCTURE: CPT

## 2021-05-23 RX ADMIN — Medication 600 MILLIGRAM(S): at 09:09

## 2021-05-23 RX ADMIN — Medication 600 MILLIGRAM(S): at 10:00

## 2021-05-23 RX ADMIN — Medication 975 MILLIGRAM(S): at 12:11

## 2021-05-23 RX ADMIN — Medication 975 MILLIGRAM(S): at 06:59

## 2021-05-23 RX ADMIN — Medication 975 MILLIGRAM(S): at 08:00

## 2021-05-23 RX ADMIN — Medication 975 MILLIGRAM(S): at 13:00

## 2021-05-23 RX ADMIN — Medication 1 TABLET(S): at 12:11

## 2021-05-23 NOTE — PROGRESS NOTE ADULT - SUBJECTIVE AND OBJECTIVE BOX
Patient evaluated at bedside.      She reports pain is well controlled with medications.     She has been ambulating without assistance, voiding spontaneously, tolerating solid food and PO fluids, and is breastfeeding.     She denies HA, dizziness, chest pain, palpitations, shortness of breath, n/v, heavy vaginal bleeding or perineal discomfort.    Physical Exam:  Vital Signs Last 24 Hrs  T(C): 36.8 (22 May 2021 21:20), Max: 36.8 (22 May 2021 10:59)  T(F): 98.2 (22 May 2021 21:20), Max: 98.2 (22 May 2021 10:59)  HR: 75 (22 May 2021 21:20) (75 - 85)  BP: 109/67 (22 May 2021 21:20) (109/67 - 111/75)  RR: 16 (22 May 2021 21:20) (16 - 16)  SpO2: 97% (22 May 2021 21:20) (97% - 97%)    GA: NAD  Abd: + BS, soft, nontender, nondistended, no rebound or guarding, uterus firm at midline below the umbilcus  : lochia WNL  Extremities: no calf tenderness

## 2021-05-23 NOTE — PROGRESS NOTE ADULT - ASSESSMENT
A/P yo 36y  s/p , PP#2, stable  1. Pain: OPM  2. GI: Reg  3. :  Voiding  4. DVT prophylaxis: SCDs, ambulation  5. Dispo: PP#2 unless otherwise specified

## 2021-05-26 LAB — SURGICAL PATHOLOGY STUDY: SIGNIFICANT CHANGE UP

## 2021-05-27 DIAGNOSIS — Z3A.36 36 WEEKS GESTATION OF PREGNANCY: ICD-10-CM

## 2021-05-27 DIAGNOSIS — Z28.21 IMMUNIZATION NOT CARRIED OUT BECAUSE OF PATIENT REFUSAL: ICD-10-CM

## 2021-05-27 DIAGNOSIS — Z34.83 ENCOUNTER FOR SUPERVISION OF OTHER NORMAL PREGNANCY, THIRD TRIMESTER: ICD-10-CM

## 2023-04-24 NOTE — PATIENT PROFILE OB - SPIRITUAL CULTURAL, CURRENT SITUATION, PROFILE
[Nasal Discharge] : nasal discharge [Postnasal Drip] : postnasal drip [Cough] : cough [Negative] : Heme/Lymph n/a

## 2023-05-11 PROCEDURE — 88307 TISSUE EXAM BY PATHOLOGIST: CPT | Mod: 26

## 2023-05-12 ENCOUNTER — INPATIENT (INPATIENT)
Facility: HOSPITAL | Age: 38
LOS: 1 days | Discharge: HOME | End: 2023-05-14
Attending: OBSTETRICS & GYNECOLOGY | Admitting: OBSTETRICS & GYNECOLOGY
Payer: COMMERCIAL

## 2023-05-12 VITALS — HEIGHT: 63 IN | WEIGHT: 179.9 LBS

## 2023-05-12 DIAGNOSIS — O26.899 OTHER SPECIFIED PREGNANCY RELATED CONDITIONS, UNSPECIFIED TRIMESTER: ICD-10-CM

## 2023-05-12 DIAGNOSIS — Z3A.00 WEEKS OF GESTATION OF PREGNANCY NOT SPECIFIED: ICD-10-CM

## 2023-05-12 LAB
BASOPHILS # BLD AUTO: 0.04 K/UL — SIGNIFICANT CHANGE UP (ref 0–0.2)
BASOPHILS NFR BLD AUTO: 0.3 % — SIGNIFICANT CHANGE UP (ref 0–2)
BLD GP AB SCN SERPL QL: NEGATIVE — SIGNIFICANT CHANGE UP
EOSINOPHIL # BLD AUTO: 0.05 K/UL — SIGNIFICANT CHANGE UP (ref 0–0.5)
EOSINOPHIL NFR BLD AUTO: 0.4 % — SIGNIFICANT CHANGE UP (ref 0–6)
HCT VFR BLD CALC: 37.4 % — SIGNIFICANT CHANGE UP (ref 34.5–45)
HGB BLD-MCNC: 12.9 G/DL — SIGNIFICANT CHANGE UP (ref 11.5–15.5)
IMM GRANULOCYTES NFR BLD AUTO: 1.7 % — HIGH (ref 0–0.9)
LYMPHOCYTES # BLD AUTO: 19.7 % — SIGNIFICANT CHANGE UP (ref 13–44)
LYMPHOCYTES # BLD AUTO: 2.25 K/UL — SIGNIFICANT CHANGE UP (ref 1–3.3)
MCHC RBC-ENTMCNC: 29.9 PG — SIGNIFICANT CHANGE UP (ref 27–34)
MCHC RBC-ENTMCNC: 34.5 GM/DL — SIGNIFICANT CHANGE UP (ref 32–36)
MCV RBC AUTO: 86.6 FL — SIGNIFICANT CHANGE UP (ref 80–100)
MONOCYTES # BLD AUTO: 0.73 K/UL — SIGNIFICANT CHANGE UP (ref 0–0.9)
MONOCYTES NFR BLD AUTO: 6.4 % — SIGNIFICANT CHANGE UP (ref 2–14)
NEUTROPHILS # BLD AUTO: 8.18 K/UL — HIGH (ref 1.8–7.4)
NEUTROPHILS NFR BLD AUTO: 71.5 % — SIGNIFICANT CHANGE UP (ref 43–77)
NRBC # BLD: 0 /100 WBCS — SIGNIFICANT CHANGE UP (ref 0–0)
PLATELET # BLD AUTO: 249 K/UL — SIGNIFICANT CHANGE UP (ref 150–400)
RBC # BLD: 4.32 M/UL — SIGNIFICANT CHANGE UP (ref 3.8–5.2)
RBC # FLD: 13.7 % — SIGNIFICANT CHANGE UP (ref 10.3–14.5)
RH IG SCN BLD-IMP: POSITIVE — SIGNIFICANT CHANGE UP
RH IG SCN BLD-IMP: POSITIVE — SIGNIFICANT CHANGE UP
WBC # BLD: 11.44 K/UL — HIGH (ref 3.8–10.5)
WBC # FLD AUTO: 11.44 K/UL — HIGH (ref 3.8–10.5)

## 2023-05-12 RX ORDER — CHLORHEXIDINE GLUCONATE 213 G/1000ML
1 SOLUTION TOPICAL DAILY
Refills: 0 | Status: DISCONTINUED | OUTPATIENT
Start: 2023-05-12 | End: 2023-05-12

## 2023-05-12 RX ORDER — IBUPROFEN 200 MG
600 TABLET ORAL EVERY 6 HOURS
Refills: 0 | Status: COMPLETED | OUTPATIENT
Start: 2023-05-12 | End: 2024-04-09

## 2023-05-12 RX ORDER — OXYTOCIN 10 UNIT/ML
41.67 VIAL (ML) INJECTION
Qty: 20 | Refills: 0 | Status: DISCONTINUED | OUTPATIENT
Start: 2023-05-12 | End: 2023-05-14

## 2023-05-12 RX ORDER — CEFAZOLIN SODIUM 1 G
2000 VIAL (EA) INJECTION ONCE
Refills: 0 | Status: COMPLETED | OUTPATIENT
Start: 2023-05-12 | End: 2023-05-12

## 2023-05-12 RX ORDER — SIMETHICONE 80 MG/1
80 TABLET, CHEWABLE ORAL EVERY 4 HOURS
Refills: 0 | Status: DISCONTINUED | OUTPATIENT
Start: 2023-05-12 | End: 2023-05-14

## 2023-05-12 RX ORDER — KETOROLAC TROMETHAMINE 30 MG/ML
30 SYRINGE (ML) INJECTION ONCE
Refills: 0 | Status: DISCONTINUED | OUTPATIENT
Start: 2023-05-12 | End: 2023-05-13

## 2023-05-12 RX ORDER — CITRIC ACID/SODIUM CITRATE 300-500 MG
15 SOLUTION, ORAL ORAL EVERY 6 HOURS
Refills: 0 | Status: DISCONTINUED | OUTPATIENT
Start: 2023-05-12 | End: 2023-05-12

## 2023-05-12 RX ORDER — HYDROCORTISONE 1 %
1 OINTMENT (GRAM) TOPICAL EVERY 6 HOURS
Refills: 0 | Status: DISCONTINUED | OUTPATIENT
Start: 2023-05-12 | End: 2023-05-14

## 2023-05-12 RX ORDER — OXYTOCIN 10 UNIT/ML
6 VIAL (ML) INJECTION
Qty: 30 | Refills: 0 | Status: DISCONTINUED | OUTPATIENT
Start: 2023-05-12 | End: 2023-05-12

## 2023-05-12 RX ORDER — SODIUM CHLORIDE 9 MG/ML
3 INJECTION INTRAMUSCULAR; INTRAVENOUS; SUBCUTANEOUS EVERY 8 HOURS
Refills: 0 | Status: DISCONTINUED | OUTPATIENT
Start: 2023-05-12 | End: 2023-05-14

## 2023-05-12 RX ORDER — AER TRAVELER 0.5 G/1
1 SOLUTION RECTAL; TOPICAL EVERY 4 HOURS
Refills: 0 | Status: DISCONTINUED | OUTPATIENT
Start: 2023-05-12 | End: 2023-05-14

## 2023-05-12 RX ORDER — ACETAMINOPHEN 500 MG
975 TABLET ORAL
Refills: 0 | Status: DISCONTINUED | OUTPATIENT
Start: 2023-05-12 | End: 2023-05-14

## 2023-05-12 RX ORDER — PRAMOXINE HYDROCHLORIDE 150 MG/15G
1 AEROSOL, FOAM RECTAL EVERY 4 HOURS
Refills: 0 | Status: DISCONTINUED | OUTPATIENT
Start: 2023-05-12 | End: 2023-05-14

## 2023-05-12 RX ORDER — BENZOCAINE 10 %
1 GEL (GRAM) MUCOUS MEMBRANE EVERY 6 HOURS
Refills: 0 | Status: DISCONTINUED | OUTPATIENT
Start: 2023-05-12 | End: 2023-05-14

## 2023-05-12 RX ORDER — TETANUS TOXOID, REDUCED DIPHTHERIA TOXOID AND ACELLULAR PERTUSSIS VACCINE, ADSORBED 5; 2.5; 8; 8; 2.5 [IU]/.5ML; [IU]/.5ML; UG/.5ML; UG/.5ML; UG/.5ML
0.5 SUSPENSION INTRAMUSCULAR ONCE
Refills: 0 | Status: DISCONTINUED | OUTPATIENT
Start: 2023-05-12 | End: 2023-05-14

## 2023-05-12 RX ORDER — FENTANYL/BUPIVACAINE/NS/PF 2MCG/ML-.1
250 PLASTIC BAG, INJECTION (ML) INJECTION
Refills: 0 | Status: DISCONTINUED | OUTPATIENT
Start: 2023-05-12 | End: 2023-05-12

## 2023-05-12 RX ORDER — OXYTOCIN 10 UNIT/ML
333.33 VIAL (ML) INJECTION
Qty: 20 | Refills: 0 | Status: DISCONTINUED | OUTPATIENT
Start: 2023-05-12 | End: 2023-05-12

## 2023-05-12 RX ORDER — DIBUCAINE 1 %
1 OINTMENT (GRAM) RECTAL EVERY 6 HOURS
Refills: 0 | Status: DISCONTINUED | OUTPATIENT
Start: 2023-05-12 | End: 2023-05-14

## 2023-05-12 RX ORDER — OXYCODONE HYDROCHLORIDE 5 MG/1
5 TABLET ORAL ONCE
Refills: 0 | Status: DISCONTINUED | OUTPATIENT
Start: 2023-05-12 | End: 2023-05-14

## 2023-05-12 RX ORDER — SODIUM CHLORIDE 9 MG/ML
1000 INJECTION, SOLUTION INTRAVENOUS
Refills: 0 | Status: DISCONTINUED | OUTPATIENT
Start: 2023-05-12 | End: 2023-05-12

## 2023-05-12 RX ORDER — MAGNESIUM HYDROXIDE 400 MG/1
30 TABLET, CHEWABLE ORAL
Refills: 0 | Status: DISCONTINUED | OUTPATIENT
Start: 2023-05-12 | End: 2023-05-14

## 2023-05-12 RX ORDER — OXYCODONE HYDROCHLORIDE 5 MG/1
5 TABLET ORAL
Refills: 0 | Status: DISCONTINUED | OUTPATIENT
Start: 2023-05-12 | End: 2023-05-14

## 2023-05-12 RX ORDER — DIPHENHYDRAMINE HCL 50 MG
25 CAPSULE ORAL EVERY 6 HOURS
Refills: 0 | Status: DISCONTINUED | OUTPATIENT
Start: 2023-05-12 | End: 2023-05-14

## 2023-05-12 RX ORDER — LANOLIN
1 OINTMENT (GRAM) TOPICAL EVERY 6 HOURS
Refills: 0 | Status: DISCONTINUED | OUTPATIENT
Start: 2023-05-12 | End: 2023-05-14

## 2023-05-12 RX ADMIN — Medication 125 MILLIUNIT(S)/MIN: at 22:49

## 2023-05-12 RX ADMIN — Medication 0.2 MILLIGRAM(S): at 23:03

## 2023-05-12 RX ADMIN — Medication 6 MILLIUNIT(S)/MIN: at 21:17

## 2023-05-12 NOTE — PRE-ANESTHESIA EVALUATION ADULT - NSANTHPMHFT_GEN_ALL_CORE
38F  at 36.4wks presents in labor requesting labor epidural 38F  at 36.4wks presents in labor requesting labor epidural  2 previous epidurals with no issues; pt had a forceps assisted delivery in the past  PMH: anemia  PSH: breast reduction

## 2023-05-13 LAB
COVID-19 SPIKE DOMAIN AB INTERP: POSITIVE
COVID-19 SPIKE DOMAIN ANTIBODY RESULT: >250 U/ML — HIGH
SARS-COV-2 IGG+IGM SERPL QL IA: >250 U/ML — HIGH
SARS-COV-2 IGG+IGM SERPL QL IA: POSITIVE
T PALLIDUM AB TITR SER: NEGATIVE — SIGNIFICANT CHANGE UP

## 2023-05-13 RX ORDER — IBUPROFEN 200 MG
600 TABLET ORAL EVERY 6 HOURS
Refills: 0 | Status: DISCONTINUED | OUTPATIENT
Start: 2023-05-13 | End: 2023-05-14

## 2023-05-13 RX ADMIN — Medication 600 MILLIGRAM(S): at 12:15

## 2023-05-13 RX ADMIN — Medication 975 MILLIGRAM(S): at 15:32

## 2023-05-13 RX ADMIN — SODIUM CHLORIDE 3 MILLILITER(S): 9 INJECTION INTRAMUSCULAR; INTRAVENOUS; SUBCUTANEOUS at 15:00

## 2023-05-13 RX ADMIN — Medication 600 MILLIGRAM(S): at 12:45

## 2023-05-13 RX ADMIN — Medication 975 MILLIGRAM(S): at 21:06

## 2023-05-13 RX ADMIN — Medication 600 MILLIGRAM(S): at 18:44

## 2023-05-13 RX ADMIN — Medication 30 MILLIGRAM(S): at 01:09

## 2023-05-13 RX ADMIN — Medication 1 TABLET(S): at 12:15

## 2023-05-13 RX ADMIN — Medication 975 MILLIGRAM(S): at 03:20

## 2023-05-13 RX ADMIN — SODIUM CHLORIDE 3 MILLILITER(S): 9 INJECTION INTRAMUSCULAR; INTRAVENOUS; SUBCUTANEOUS at 21:46

## 2023-05-13 RX ADMIN — Medication 600 MILLIGRAM(S): at 06:00

## 2023-05-13 RX ADMIN — Medication 600 MILLIGRAM(S): at 19:14

## 2023-05-13 RX ADMIN — Medication 600 MILLIGRAM(S): at 05:37

## 2023-05-13 RX ADMIN — Medication 975 MILLIGRAM(S): at 21:46

## 2023-05-13 RX ADMIN — Medication 975 MILLIGRAM(S): at 02:56

## 2023-05-13 RX ADMIN — Medication 975 MILLIGRAM(S): at 08:55

## 2023-05-13 RX ADMIN — Medication 975 MILLIGRAM(S): at 15:02

## 2023-05-13 RX ADMIN — SODIUM CHLORIDE 3 MILLILITER(S): 9 INJECTION INTRAMUSCULAR; INTRAVENOUS; SUBCUTANEOUS at 06:00

## 2023-05-13 RX ADMIN — Medication 975 MILLIGRAM(S): at 09:15

## 2023-05-14 VITALS
SYSTOLIC BLOOD PRESSURE: 119 MMHG | TEMPERATURE: 98 F | OXYGEN SATURATION: 98 % | HEART RATE: 89 BPM | RESPIRATION RATE: 17 BRPM | DIASTOLIC BLOOD PRESSURE: 79 MMHG

## 2023-05-14 PROCEDURE — 86850 RBC ANTIBODY SCREEN: CPT

## 2023-05-14 PROCEDURE — 88307 TISSUE EXAM BY PATHOLOGIST: CPT

## 2023-05-14 PROCEDURE — 86901 BLOOD TYPING SEROLOGIC RH(D): CPT

## 2023-05-14 PROCEDURE — 36415 COLL VENOUS BLD VENIPUNCTURE: CPT

## 2023-05-14 PROCEDURE — 86769 SARS-COV-2 COVID-19 ANTIBODY: CPT

## 2023-05-14 PROCEDURE — 85025 COMPLETE CBC W/AUTO DIFF WBC: CPT

## 2023-05-14 PROCEDURE — 86900 BLOOD TYPING SEROLOGIC ABO: CPT

## 2023-05-14 PROCEDURE — 99214 OFFICE O/P EST MOD 30 MIN: CPT

## 2023-05-14 PROCEDURE — 59050 FETAL MONITOR W/REPORT: CPT

## 2023-05-14 PROCEDURE — 86780 TREPONEMA PALLIDUM: CPT

## 2023-05-14 RX ADMIN — Medication 600 MILLIGRAM(S): at 00:45

## 2023-05-14 RX ADMIN — Medication 600 MILLIGRAM(S): at 00:08

## 2023-05-14 RX ADMIN — SODIUM CHLORIDE 3 MILLILITER(S): 9 INJECTION INTRAMUSCULAR; INTRAVENOUS; SUBCUTANEOUS at 06:21

## 2023-05-14 RX ADMIN — Medication 600 MILLIGRAM(S): at 11:44

## 2023-05-14 RX ADMIN — Medication 30 MILLIGRAM(S): at 01:40

## 2023-05-14 RX ADMIN — Medication 600 MILLIGRAM(S): at 06:21

## 2023-05-14 RX ADMIN — Medication 600 MILLIGRAM(S): at 05:40

## 2023-05-14 RX ADMIN — Medication 1 TABLET(S): at 11:44

## 2023-05-14 RX ADMIN — Medication 975 MILLIGRAM(S): at 08:47

## 2023-05-14 RX ADMIN — Medication 600 MILLIGRAM(S): at 12:14

## 2023-05-14 RX ADMIN — Medication 975 MILLIGRAM(S): at 09:34

## 2023-05-14 NOTE — DISCHARGE NOTE OB - CARE PLAN
1 Principal Discharge DX:	Postpartum state  Assessment and plan of treatment:	Please follow-up with your OB doctor within 4-6 weeks. You can resume a regular diet at home and you should continue your prenatal vitamins as directed. You can take Motrin 600mg by mouth every 6 hours for pain as needed.    Please place nothing in the vagina for 6 weeks (no tampons, no sex, no douching, no baths, no hot tubs, no swimming pools, etc). If you have severe headaches and/or vision changes, heavy bleeding, chest pain, or shortness of breath, please call your provider or go to the nearest ED. Call your OB with any signs of symptoms of infection including fever > 100.4 degrees, severe pain, malodorous vaginal discharge or heavy bleeding requiring more than 1-2 pads/hour.

## 2023-05-14 NOTE — DISCHARGE NOTE OB - CARE PROVIDER_API CALL
Romel Mahajan  OBSTETRICS AND GYNECOLOGY  [address]  [place] [address]  Phone: [phone]  Fax: [phone]  Follow Up Time:

## 2023-05-14 NOTE — DISCHARGE NOTE OB - PATIENT PORTAL LINK FT
You can access the FollowMyHealth Patient Portal offered by [facility] by registering at the following website: [email or web address]. By joining [employer]’s FollowMyHealth portal, you will also be able to view your health information using other applications (apps) compatible with our system.

## 2023-05-14 NOTE — DISCHARGE NOTE OB - NS MD DC FALL RISK RISK
For information on Fall & Injury Prevention, visit: [email or web address] OR  [email or web address] OR  https://www.cdc.gov/steadi/patient.html

## 2023-05-14 NOTE — DISCHARGE NOTE OB - MEDICATION SUMMARY - MEDICATIONS TO TAKE
I will START or STAY ON the medications listed below when I get home from the hospital:    ibuprofen 600 mg oral tablet  -- 1 tab(s) by mouth every 6 hours  -- Indication: For Postpartum state    acetaminophen 325 mg oral tablet  -- 3 tab(s) by mouth every 6 hours  -- Indication: For Postpartum state    Prenatal Multivitamins with Folic Acid 1 mg oral tablet  -- 1 tab(s) by mouth once a day  -- Indication: For Postpartum state

## 2023-05-14 NOTE — PROGRESS NOTE ADULT - SUBJECTIVE AND OBJECTIVE BOX
Patient evaluated at bedside this morning, resting comfortably in bed, no acute events overnight.  She reports pain is well controlled with tylenol and motrin.  She denies headache, dizziness, chest pain, palpitations, shortness of breath, nausea, vomiting, heavy vaginal bleeding or perineal discomfort. Reports decrease in amount of vaginal bleeding and denies clots.  She has been ambulating without assistance, voiding spontaneously, and is breastfeeding.   Tolerating food well, without nausea/vomiting.  Passing flatus.     Physical Exam:  T(C): 36.6 (05-14-23 @ 05:56), Max: 36.6 (05-13-23 @ 22:11)  HR: 89 (05-14-23 @ 05:56) (83 - 89)  BP: 119/79 (05-14-23 @ 05:56) (115/76 - 119/79)  RR: 17 (05-14-23 @ 05:56) (17 - 18)  SpO2: 98% (05-14-23 @ 05:56) (97% - 98%)    GA: NAD  Resp: breathing comfortably on room air  Abd: soft, nontender, nondistended, no rebound or guarding, uterus firm at midline and below umbilicus  Perineum: normal lochia  Extremities: no swelling or calf tenderness                            12.9   11.44 )-----------( 249      ( 12 May 2023 19:17 )             37.4           acetaminophen     Tablet .. 975 milliGRAM(s) Oral <User Schedule>  benzocaine 20%/menthol 0.5% Spray 1 Spray(s) Topical every 6 hours PRN  dibucaine 1% Ointment 1 Application(s) Topical every 6 hours PRN  diphenhydrAMINE 25 milliGRAM(s) Oral every 6 hours PRN  diphtheria/tetanus/pertussis (acellular) Vaccine (Adacel) 0.5 milliLiter(s) IntraMuscular once  hydrocortisone 1% Cream 1 Application(s) Topical every 6 hours PRN  ibuprofen  Tablet. 600 milliGRAM(s) Oral every 6 hours  lanolin Ointment 1 Application(s) Topical every 6 hours PRN  magnesium hydroxide Suspension 30 milliLiter(s) Oral two times a day PRN  oxyCODONE    IR 5 milliGRAM(s) Oral once PRN  oxyCODONE    IR 5 milliGRAM(s) Oral every 3 hours PRN  oxytocin Infusion 41.667 milliUNIT(s)/Min IV Continuous <Continuous>  pramoxine 1%/zinc 5% Cream 1 Application(s) Topical every 4 hours PRN  prenatal multivitamin 1 Tablet(s) Oral daily  simethicone 80 milliGRAM(s) Chew every 4 hours PRN  sodium chloride 0.9% lock flush 3 milliLiter(s) IV Push every 8 hours  witch hazel Pads 1 Application(s) Topical every 4 hours PRN    
Patient evaluated at bedside this morning, resting comfortably in bed, no acute events overnight.  She reports pain is well controlled with tylenol and motrin.  She denies headache, dizziness, chest pain, palpitations, shortness of breath, nausea, vomiting, heavy vaginal bleeding or perineal discomfort. Reports decrease in amount of vaginal bleeding and denies clots.  She has been ambulating without assistance, voiding spontaneously, and is breastfeeding.   Tolerating food well, without nausea/vomiting.  Passing flatus.     Physical Exam:  T(C): 36.6 (05-13-23 @ 05:46), Max: 37 (05-13-23 @ 01:45)  HR: 81 (05-13-23 @ 05:46) (81 - 126)  BP: 124/82 (05-13-23 @ 05:46) (109/94 - 136/49)  RR: 17 (05-13-23 @ 05:46) (17 - 18)  SpO2: 96% (05-13-23 @ 05:46) (96% - 98%)    GA: NAD  Resp: breathing comfortably on room air  Abd: soft, nontender, nondistended, no rebound or guarding, uterus firm at midline and below umbilicus  Perineum: normal lochia  Extremities: no swelling or calf tenderness                            12.9   11.44 )-----------( 249      ( 12 May 2023 19:17 )             37.4           acetaminophen     Tablet .. 975 milliGRAM(s) Oral <User Schedule>  benzocaine 20%/menthol 0.5% Spray 1 Spray(s) Topical every 6 hours PRN  dibucaine 1% Ointment 1 Application(s) Topical every 6 hours PRN  diphenhydrAMINE 25 milliGRAM(s) Oral every 6 hours PRN  diphtheria/tetanus/pertussis (acellular) Vaccine (Adacel) 0.5 milliLiter(s) IntraMuscular once  hydrocortisone 1% Cream 1 Application(s) Topical every 6 hours PRN  ibuprofen  Tablet. 600 milliGRAM(s) Oral every 6 hours  lanolin Ointment 1 Application(s) Topical every 6 hours PRN  magnesium hydroxide Suspension 30 milliLiter(s) Oral two times a day PRN  oxyCODONE    IR 5 milliGRAM(s) Oral once PRN  oxyCODONE    IR 5 milliGRAM(s) Oral every 3 hours PRN  oxytocin Infusion 41.667 milliUNIT(s)/Min IV Continuous <Continuous>  pramoxine 1%/zinc 5% Cream 1 Application(s) Topical every 4 hours PRN  prenatal multivitamin 1 Tablet(s) Oral daily  simethicone 80 milliGRAM(s) Chew every 4 hours PRN  sodium chloride 0.9% lock flush 3 milliLiter(s) IV Push every 8 hours  witch hazel Pads 1 Application(s) Topical every 4 hours PRN

## 2023-05-14 NOTE — PROGRESS NOTE ADULT - ASSESSMENT
A/P 38y s/p , PPD2, stable, meeting postpartum milestones   - Pain: well controlled on tylenol and motrin  - GI: Tolerating regular diet, milk of magnesia PRN  - : urinating without difficulty/pain  - DVT prophylaxis: ambulating frequently  - Dispo: discharge today, PPD 2, unless otherwise specified  
A/P 38y s/p , PPD1, stable, meeting postpartum milestones   - Pain: well controlled on tylenol and motrin  - GI: Tolerating regular diet, milk of magnesia PRN  - : urinating without difficulty/pain  - DVT prophylaxis: ambulating frequently  - Dispo: PPD 2, unless otherwise specified

## 2023-05-14 NOTE — DISCHARGE NOTE OB - WILL THE PATIENT ACCEPT THE PFIZER COVID-19 VACCINE IF ELIGIBLE AND IT IS AVAILABLE?
[facility] Call Center    Phone Message    May a detailed message be left on voicemail: yes     Reason for Call: Other: Kassi calling to request a call back to discuss Gorge's telephone appointment on 12/15. She stated that it is to check his shunt and she is wondering how that will be done over the phone. Please call her at your earliest convenience to discuss.     Action Taken: Message routed to:  [facility] ([facility]    Travel Screening: Not Applicable                                                                       No

## 2023-05-14 NOTE — DISCHARGE NOTE OB - WEAR SUPPORTIVE BRA
[facility]     Occupational Therapy: Daily Note   Patient: Desiree Weinberg (6 wk.o. male)   Date:   Plan of Care Certification Period:      :  2023  MRN: [id number]  CSN: [id number]   Insurance: Payor: [employer] / Plan: [employer] TRADITIONAL / Product Type: *No Product type* /   Insurance ID: [id number] - (Commercial) Secondary Insurance (if applicable):    Referring Physician: Renetta Fermin MD     PCP: Renetta Fermin MD Visits to Date:     Progress note:   Visits Approved:      No Show:    Cancelled Appts:      Medical Diagnosis: Torticollis [M43.6]  Feeding difficulty in infant [R63.39]          Therapy Time     Time in  1000   Time out  1100   Total treatment minutes  60   Total time code minutes   0        OT Manual therapy 30 minutes for 2 unit(s), CPT 06207  OT Neuromuscular 30 minutes for 2 unit(s), CPT 72544       SUBJECTIVE EXAMINATION     Patient's date of birth confirmed: Yes     Pain Level:   No SOS of pain   Location: NA  Description: NA    Patient Comments:   Mom reports difficulty getting in \"tummy time\" due to need to pump and feed along with needed self care to keep up supply         Learning/Language barrier: no,        HEP/Strategies/Orthosis Compliance: Patient verbally confirmed compliant with HEP's          Restrictions: none           OBJECTIVE EXAMINATION   Pt was alert and responsive throughout session with a wide variety of vocalizations and facial expressions.       TREATMENT     Focus of treatment was on the following:   decreasing fascial/soft tissue restrictions, increase left  cervical rotation and capital extension active and passive  ROM, neuromuscular re-education, and posture/positioning     MFR/Manual:   Pt treated prone on mat table, supine on mat table, and side lying on mat table  Craniosacral release: deep release bilateral  longus colli , bilateral  vertical/horizontal masseter , and bilateral  scalene  and Statement Selected

## 2023-05-14 NOTE — DISCHARGE NOTE OB - HOSPITAL COURSE
Pt is a 38yF s/p .  Please see delivery note for details.  During postpartum course patient's vitals were stable, vaginal bleeding appropriate, and pain well controlled.  On day of discharge patient was ambulating, pt had adequate oral intake, and was voiding freely.  Discharge instructions and precautions were given.  Will return to clinic in 4 weeks for postpartum visit.

## 2023-05-17 DIAGNOSIS — Z3A.36 36 WEEKS GESTATION OF PREGNANCY: ICD-10-CM

## 2023-05-17 DIAGNOSIS — Z28.09 IMMUNIZATION NOT CARRIED OUT BECAUSE OF OTHER CONTRAINDICATION: ICD-10-CM

## 2023-05-17 DIAGNOSIS — O42.92 FULL-TERM PREMATURE RUPTURE OF MEMBRANES, UNSPECIFIED AS TO LENGTH OF TIME BETWEEN RUPTURE AND ONSET OF LABOR: ICD-10-CM

## 2023-06-06 LAB — SURGICAL PATHOLOGY STUDY: SIGNIFICANT CHANGE UP

## 2023-08-23 ENCOUNTER — OFFICE VISIT (OUTPATIENT)
Dept: OBGYN | Age: 38
End: 2023-08-23

## 2023-08-23 VITALS
HEART RATE: 101 BPM | SYSTOLIC BLOOD PRESSURE: 108 MMHG | TEMPERATURE: 97.3 F | WEIGHT: 159.39 LBS | DIASTOLIC BLOOD PRESSURE: 74 MMHG

## 2023-08-23 DIAGNOSIS — N83.209 CYST OF OVARY, UNSPECIFIED LATERALITY: Primary | ICD-10-CM

## 2023-08-23 PROCEDURE — 99203 OFFICE O/P NEW LOW 30 MIN: CPT | Performed by: OBSTETRICS & GYNECOLOGY

## 2023-08-23 RX ORDER — LEVONORGESTREL 52 MG/1
INTRAUTERINE DEVICE INTRAUTERINE
COMMUNITY

## 2023-08-23 ASSESSMENT — ENCOUNTER SYMPTOMS
ABDOMINAL DISTENTION: 1
ABDOMINAL PAIN: 1

## 2023-08-23 ASSESSMENT — PAIN SCALES - GENERAL: PAINLEVEL: 0

## 2024-02-22 ENCOUNTER — OFFICE VISIT (OUTPATIENT)
Dept: MATERNAL FETAL MEDICINE | Age: 39
End: 2024-02-22
Attending: OBSTETRICS & GYNECOLOGY

## 2024-02-22 DIAGNOSIS — N83.201 CYST OF OVARY, RIGHT: Primary | ICD-10-CM

## 2024-02-22 DIAGNOSIS — N83.201 CYST OF RIGHT OVARY: ICD-10-CM

## 2024-02-22 DIAGNOSIS — Z97.5 IUD (INTRAUTERINE DEVICE) IN PLACE: ICD-10-CM

## 2024-02-22 DIAGNOSIS — N83.209 CYST OF OVARY, UNSPECIFIED LATERALITY: Primary | ICD-10-CM

## 2024-02-22 DIAGNOSIS — Z30.431 IUD CHECK UP: ICD-10-CM

## 2024-02-22 PROCEDURE — 76830 TRANSVAGINAL US NON-OB: CPT | Performed by: OBSTETRICS & GYNECOLOGY

## 2024-03-01 ENCOUNTER — TELEPHONE (OUTPATIENT)
Dept: OBGYN | Age: 39
End: 2024-03-01

## 2024-03-04 ENCOUNTER — APPOINTMENT (OUTPATIENT)
Dept: OBGYN | Age: 39
End: 2024-03-04

## 2024-03-04 VITALS
BODY MASS INDEX: 25.96 KG/M2 | HEART RATE: 90 BPM | DIASTOLIC BLOOD PRESSURE: 78 MMHG | HEIGHT: 63 IN | SYSTOLIC BLOOD PRESSURE: 111 MMHG | WEIGHT: 146.5 LBS | TEMPERATURE: 97.6 F

## 2024-03-04 DIAGNOSIS — R22.32 AXILLARY MASS, LEFT: Primary | ICD-10-CM

## 2024-03-04 DIAGNOSIS — Z01.419 GYNECOLOGIC EXAM NORMAL: ICD-10-CM

## 2024-03-04 PROCEDURE — 99395 PREV VISIT EST AGE 18-39: CPT | Performed by: OBSTETRICS & GYNECOLOGY

## 2024-03-04 RX ORDER — ZALEPLON 5 MG/1
5 CAPSULE ORAL
COMMUNITY
Start: 2023-12-11

## 2024-03-04 ASSESSMENT — PAIN SCALES - GENERAL: PAINLEVEL: 0

## 2024-03-05 ENCOUNTER — E-ADVICE (OUTPATIENT)
Dept: OBGYN | Age: 39
End: 2024-03-05

## 2024-03-05 ENCOUNTER — TELEPHONE (OUTPATIENT)
Dept: OBGYN | Age: 39
End: 2024-03-05

## 2024-03-05 DIAGNOSIS — R22.32 AXILLARY MASS, LEFT: Primary | ICD-10-CM

## 2024-03-12 LAB
CASE RPRT: ABNORMAL
CLINICAL INFO: ABNORMAL
CYTOLOGY CVX/VAG STUDY: ABNORMAL
CYTOLOGY CVX/VAG STUDY: ABNORMAL
GEN CATEG CVX/VAG CYTO-IMP: ABNORMAL
HPV16+18+45 E6+E7MRNA CVX NAA+PROBE: NEGATIVE
Lab: NORMAL
PAP EDUCATIONAL NOTE: ABNORMAL
SPECIMEN ADEQUACY: ABNORMAL

## 2024-03-28 ENCOUNTER — HOSPITAL ENCOUNTER (OUTPATIENT)
Dept: MAMMOGRAPHY | Age: 39
Discharge: HOME | End: 2024-03-28
Attending: OBSTETRICS & GYNECOLOGY

## 2024-03-28 DIAGNOSIS — R22.32 AXILLARY MASS, LEFT: ICD-10-CM

## 2024-03-28 PROCEDURE — 76641 ULTRASOUND BREAST COMPLETE: CPT

## 2024-03-28 PROCEDURE — 77066 DX MAMMO INCL CAD BI: CPT

## 2024-04-10 ENCOUNTER — APPOINTMENT (OUTPATIENT)
Dept: SURGERY | Age: 39
End: 2024-04-10

## 2024-04-10 ENCOUNTER — PREP FOR CASE (OUTPATIENT)
Dept: SURGERY | Age: 39
End: 2024-04-10

## 2024-04-10 VITALS
TEMPERATURE: 97.9 F | DIASTOLIC BLOOD PRESSURE: 75 MMHG | OXYGEN SATURATION: 96 % | HEIGHT: 63 IN | SYSTOLIC BLOOD PRESSURE: 107 MMHG | BODY MASS INDEX: 26.05 KG/M2 | WEIGHT: 147 LBS | HEART RATE: 88 BPM

## 2024-04-10 DIAGNOSIS — N63.32 MASS OF AXILLARY TAIL OF LEFT BREAST: Primary | ICD-10-CM

## 2024-04-10 DIAGNOSIS — Q83.1 ACCESSORY BREAST TISSUE OF AXILLA: ICD-10-CM

## 2024-04-10 DIAGNOSIS — Z13.79 ASHKENAZI JEWISH ANCESTRY REQUIRING POPULATION-SPECIFIC GENETIC SCREENING: Primary | ICD-10-CM

## 2024-04-10 PROCEDURE — 99204 OFFICE O/P NEW MOD 45 MIN: CPT | Performed by: SURGERY

## 2024-04-10 RX ORDER — CELECOXIB 200 MG/1
400 CAPSULE ORAL
OUTPATIENT
Start: 2024-04-10

## 2024-04-10 RX ORDER — ACETAMINOPHEN 500 MG
1000 TABLET ORAL
OUTPATIENT
Start: 2024-04-10

## 2024-04-10 RX ORDER — GABAPENTIN 100 MG/1
300 CAPSULE ORAL
OUTPATIENT
Start: 2024-04-10

## 2024-04-21 ENCOUNTER — ADMINISTRATIVE DOCUMENTATION (OUTPATIENT)
Dept: OBGYN | Age: 39
End: 2024-04-21

## 2024-04-25 ENCOUNTER — TELEPHONE (OUTPATIENT)
Dept: SURGERY | Age: 39
End: 2024-04-25

## 2024-05-02 ASSESSMENT — ACTIVITIES OF DAILY LIVING (ADL)
RECENT_DECLINE_ADL: NO
NEEDS_ASSIST: NO
HISTORY OF FALLING IN THE LAST YEAR (PRIOR TO ADMISSION): NO
ADL_SHORT_OF_BREATH: NO
CHRONIC_PAIN_PRESENT: NO
ADL_SCORE: 12
ADL_BEFORE_ADMISSION: INDEPENDENT

## 2024-05-02 ASSESSMENT — COGNITIVE AND FUNCTIONAL STATUS - GENERAL
ARE YOU BLIND OR DO YOU HAVE SERIOUS DIFFICULTY SEEING, EVEN WHEN WEARING GLASSES: NO
ARE YOU DEAF OR DO YOU HAVE SERIOUS DIFFICULTY  HEARING: NO

## 2024-05-06 ENCOUNTER — ANESTHESIA EVENT (OUTPATIENT)
Dept: SURGERY | Age: 39
End: 2024-05-06

## 2024-05-06 ENCOUNTER — APPOINTMENT (OUTPATIENT)
Dept: SURGERY | Age: 39
End: 2024-05-06

## 2024-05-07 ENCOUNTER — HOSPITAL ENCOUNTER (OUTPATIENT)
Age: 39
Discharge: HOME | End: 2024-05-07
Attending: SURGERY | Admitting: SURGERY

## 2024-05-07 ENCOUNTER — HOSPITAL ENCOUNTER (OUTPATIENT)
Dept: MAMMOGRAPHY | Age: 39
Discharge: HOME | End: 2024-05-07
Attending: SURGERY

## 2024-05-07 ENCOUNTER — ANESTHESIA (OUTPATIENT)
Dept: SURGERY | Age: 39
End: 2024-05-07

## 2024-05-07 VITALS
SYSTOLIC BLOOD PRESSURE: 117 MMHG | WEIGHT: 148.59 LBS | TEMPERATURE: 97.2 F | DIASTOLIC BLOOD PRESSURE: 71 MMHG | BODY MASS INDEX: 28.05 KG/M2 | OXYGEN SATURATION: 100 % | RESPIRATION RATE: 16 BRPM | HEART RATE: 83 BPM | HEIGHT: 61 IN

## 2024-05-07 DIAGNOSIS — N63.32 MASS OF AXILLARY TAIL OF LEFT BREAST: ICD-10-CM

## 2024-05-07 LAB
B-HCG UR QL: NEGATIVE
INTERNAL PROCEDURAL CONTROLS ACCEPTABLE: YES
TEST LOT EXPIRATION DATE: NORMAL
TEST LOT NUMBER: NORMAL

## 2024-05-07 PROCEDURE — 10002803 HB RX 637: Performed by: SURGERY

## 2024-05-07 PROCEDURE — 13000034 HB BASIC CASE  S/U +1ST 15 MIN: Performed by: SURGERY

## 2024-05-07 PROCEDURE — 88307 TISSUE EXAM BY PATHOLOGIST: CPT | Performed by: SURGERY

## 2024-05-07 PROCEDURE — 10002800 HB RX 250 W HCPCS

## 2024-05-07 PROCEDURE — 81025 URINE PREGNANCY TEST: CPT | Performed by: SURGERY

## 2024-05-07 PROCEDURE — 13000002 HB ANESTHESIA  GENERAL  S/U + 1ST 15 MIN: Performed by: SURGERY

## 2024-05-07 PROCEDURE — 10002801 HB RX 250 W/O HCPCS

## 2024-05-07 PROCEDURE — 10004651 HB RX, NO CHARGE ITEM: Performed by: SURGERY

## 2024-05-07 PROCEDURE — 10002807 HB RX 258

## 2024-05-07 PROCEDURE — 13000003 HB ANESTHESIA  GENERAL EA ADD MINUTE: Performed by: SURGERY

## 2024-05-07 PROCEDURE — 10002801 HB RX 250 W/O HCPCS: Performed by: SURGERY

## 2024-05-07 PROCEDURE — 10006027 HB SUPPLY 278: Performed by: SURGERY

## 2024-05-07 PROCEDURE — 13000035 HB BASIC CASE EA ADD MINUTE: Performed by: SURGERY

## 2024-05-07 PROCEDURE — 10006023 HB SUPPLY 272: Performed by: SURGERY

## 2024-05-07 PROCEDURE — 10002800 HB RX 250 W HCPCS: Performed by: SURGERY

## 2024-05-07 PROCEDURE — 13000001 HB PHASE II RECOVERY EA 30 MINUTES: Performed by: SURGERY

## 2024-05-07 PROCEDURE — 10004452 HB PACU ADDL 30 MINUTES: Performed by: SURGERY

## 2024-05-07 PROCEDURE — 10004451 HB PACU RECOVERY 1ST 30 MINUTES: Performed by: SURGERY

## 2024-05-07 DEVICE — LIGACLIP MCA MULTIPLE CLIP APPLIERS, 20 MEDIUM CLIPS
Type: IMPLANTABLE DEVICE | Site: BREAST | Status: FUNCTIONAL
Brand: LIGACLIP

## 2024-05-07 RX ORDER — NICOTINE POLACRILEX 4 MG
30 LOZENGE BUCCAL
Status: DISCONTINUED | OUTPATIENT
Start: 2024-05-07 | End: 2024-05-07 | Stop reason: HOSPADM

## 2024-05-07 RX ORDER — ONDANSETRON 2 MG/ML
INJECTION INTRAMUSCULAR; INTRAVENOUS PRN
Status: DISCONTINUED | OUTPATIENT
Start: 2024-05-07 | End: 2024-05-07

## 2024-05-07 RX ORDER — GABAPENTIN 300 MG/1
300 CAPSULE ORAL
Status: COMPLETED | OUTPATIENT
Start: 2024-05-07 | End: 2024-05-07

## 2024-05-07 RX ORDER — LIDOCAINE HYDROCHLORIDE 20 MG/ML
INJECTION, SOLUTION INFILTRATION; PERINEURAL PRN
Status: DISCONTINUED | OUTPATIENT
Start: 2024-05-07 | End: 2024-05-07

## 2024-05-07 RX ORDER — SODIUM CHLORIDE, SODIUM LACTATE, POTASSIUM CHLORIDE, CALCIUM CHLORIDE 600; 310; 30; 20 MG/100ML; MG/100ML; MG/100ML; MG/100ML
INJECTION, SOLUTION INTRAVENOUS CONTINUOUS PRN
Status: DISCONTINUED | OUTPATIENT
Start: 2024-05-07 | End: 2024-05-07

## 2024-05-07 RX ORDER — PROCHLORPERAZINE EDISYLATE 5 MG/ML
5 INJECTION INTRAMUSCULAR; INTRAVENOUS
Status: DISCONTINUED | OUTPATIENT
Start: 2024-05-07 | End: 2024-05-07 | Stop reason: HOSPADM

## 2024-05-07 RX ORDER — ACETAMINOPHEN 500 MG
1000 TABLET ORAL
Status: COMPLETED | OUTPATIENT
Start: 2024-05-07 | End: 2024-05-07

## 2024-05-07 RX ORDER — PROPOFOL 10 MG/ML
INJECTION, EMULSION INTRAVENOUS PRN
Status: DISCONTINUED | OUTPATIENT
Start: 2024-05-07 | End: 2024-05-07

## 2024-05-07 RX ORDER — DEXAMETHASONE SODIUM PHOSPHATE 4 MG/ML
INJECTION, SOLUTION INTRA-ARTICULAR; INTRALESIONAL; INTRAMUSCULAR; INTRAVENOUS; SOFT TISSUE PRN
Status: DISCONTINUED | OUTPATIENT
Start: 2024-05-07 | End: 2024-05-07

## 2024-05-07 RX ORDER — CELECOXIB 200 MG/1
200 CAPSULE ORAL DAILY
Qty: 30 CAPSULE | Refills: 1 | Status: SHIPPED | OUTPATIENT
Start: 2024-05-07

## 2024-05-07 RX ORDER — CELECOXIB 200 MG/1
400 CAPSULE ORAL
Status: COMPLETED | OUTPATIENT
Start: 2024-05-07 | End: 2024-05-07

## 2024-05-07 RX ORDER — HYDRALAZINE HYDROCHLORIDE 20 MG/ML
5 INJECTION INTRAMUSCULAR; INTRAVENOUS EVERY 10 MIN PRN
Status: DISCONTINUED | OUTPATIENT
Start: 2024-05-07 | End: 2024-05-07 | Stop reason: HOSPADM

## 2024-05-07 RX ORDER — MIDAZOLAM HYDROCHLORIDE 1 MG/ML
INJECTION, SOLUTION INTRAMUSCULAR; INTRAVENOUS PRN
Status: DISCONTINUED | OUTPATIENT
Start: 2024-05-07 | End: 2024-05-07

## 2024-05-07 RX ORDER — DEXTROSE MONOHYDRATE 25 G/50ML
25 INJECTION, SOLUTION INTRAVENOUS PRN
Status: DISCONTINUED | OUTPATIENT
Start: 2024-05-07 | End: 2024-05-07 | Stop reason: HOSPADM

## 2024-05-07 RX ORDER — BUPIVACAINE HYDROCHLORIDE AND EPINEPHRINE 5; 5 MG/ML; UG/ML
INJECTION, SOLUTION EPIDURAL; INTRACAUDAL; PERINEURAL PRN
Status: DISCONTINUED | OUTPATIENT
Start: 2024-05-07 | End: 2024-05-07 | Stop reason: HOSPADM

## 2024-05-07 RX ADMIN — HYDROMORPHONE HYDROCHLORIDE 0.4 MG: 1 INJECTION, SOLUTION INTRAMUSCULAR; INTRAVENOUS; SUBCUTANEOUS at 13:53

## 2024-05-07 RX ADMIN — HYDROMORPHONE HYDROCHLORIDE 0.4 MG: 1 INJECTION, SOLUTION INTRAMUSCULAR; INTRAVENOUS; SUBCUTANEOUS at 13:42

## 2024-05-07 RX ADMIN — MIDAZOLAM HYDROCHLORIDE 2 MG: 1 INJECTION, SOLUTION INTRAMUSCULAR; INTRAVENOUS at 12:20

## 2024-05-07 RX ADMIN — FENTANYL CITRATE 50 MCG: 50 INJECTION INTRAMUSCULAR; INTRAVENOUS at 12:38

## 2024-05-07 RX ADMIN — FENTANYL CITRATE 25 MCG: 50 INJECTION INTRAMUSCULAR; INTRAVENOUS at 12:20

## 2024-05-07 RX ADMIN — SODIUM CHLORIDE, POTASSIUM CHLORIDE, SODIUM LACTATE AND CALCIUM CHLORIDE: 600; 310; 30; 20 INJECTION, SOLUTION INTRAVENOUS at 12:22

## 2024-05-07 RX ADMIN — PROPOFOL 200 MG: 10 INJECTION, EMULSION INTRAVENOUS at 12:27

## 2024-05-07 RX ADMIN — LIDOCAINE HYDROCHLORIDE 5 ML: 20 INJECTION, SOLUTION INFILTRATION; PERINEURAL at 12:27

## 2024-05-07 RX ADMIN — ACETAMINOPHEN 1000 MG: 500 TABLET ORAL at 11:09

## 2024-05-07 RX ADMIN — DEXAMETHASONE SODIUM PHOSPHATE 4 MG: 4 INJECTION INTRA-ARTICULAR; INTRALESIONAL; INTRAMUSCULAR; INTRAVENOUS; SOFT TISSUE at 12:27

## 2024-05-07 RX ADMIN — ONDANSETRON 4 MG: 2 INJECTION INTRAMUSCULAR; INTRAVENOUS at 13:06

## 2024-05-07 RX ADMIN — CEFAZOLIN SODIUM 2000 MG: 300 INJECTION, POWDER, LYOPHILIZED, FOR SOLUTION INTRAVENOUS at 12:24

## 2024-05-07 RX ADMIN — Medication 50 MCG: at 13:01

## 2024-05-07 RX ADMIN — CELECOXIB 400 MG: 200 CAPSULE ORAL at 11:09

## 2024-05-07 RX ADMIN — GABAPENTIN 300 MG: 300 CAPSULE ORAL at 11:09

## 2024-05-07 RX ADMIN — Medication 50 MCG: at 12:56

## 2024-05-07 ASSESSMENT — PAIN SCALES - GENERAL
PAINLEVEL_OUTOF10: 3
PAINLEVEL_OUTOF10: 3
PAINLEVEL_OUTOF10: 0
PAINLEVEL_OUTOF10: 0

## 2024-05-07 ASSESSMENT — ENCOUNTER SYMPTOMS: EXERCISE TOLERANCE: GOOD (>4 METS)

## 2024-05-09 LAB
ASR DISCLAIMER: NORMAL
CASE RPRT: NORMAL
CLINICAL INFO: NORMAL
PATH REPORT.FINAL DX SPEC: NORMAL
PATH REPORT.GROSS SPEC: NORMAL

## 2024-05-15 ENCOUNTER — CLINICAL DOCUMENTATION (OUTPATIENT)
Dept: HEMATOLOGY/ONCOLOGY | Age: 39
End: 2024-05-15

## 2024-05-20 ENCOUNTER — APPOINTMENT (OUTPATIENT)
Dept: SURGERY | Age: 39
End: 2024-05-20

## 2024-05-20 VITALS
SYSTOLIC BLOOD PRESSURE: 104 MMHG | TEMPERATURE: 97.9 F | WEIGHT: 145.5 LBS | DIASTOLIC BLOOD PRESSURE: 70 MMHG | OXYGEN SATURATION: 98 % | BODY MASS INDEX: 25.78 KG/M2 | HEART RATE: 101 BPM | HEIGHT: 63 IN

## 2024-05-20 DIAGNOSIS — Q83.1 ACCESSORY BREAST TISSUE OF AXILLA: Primary | ICD-10-CM

## 2024-05-20 DIAGNOSIS — N63.32 MASS OF AXILLARY TAIL OF LEFT BREAST: ICD-10-CM

## 2024-05-20 PROCEDURE — 99024 POSTOP FOLLOW-UP VISIT: CPT | Performed by: SURGERY

## 2024-05-28 ENCOUNTER — OFFICE VISIT (OUTPATIENT)
Dept: HEMATOLOGY/ONCOLOGY | Age: 39
End: 2024-05-28
Attending: SURGERY

## 2024-05-28 ENCOUNTER — EXTERNAL LAB (OUTPATIENT)
Dept: HEALTH INFORMATION MANAGEMENT | Facility: OTHER | Age: 39
End: 2024-05-28

## 2024-05-28 DIAGNOSIS — Z13.79 ASHKENAZI JEWISH ANCESTRY REQUIRING POPULATION-SPECIFIC GENETIC SCREENING: Primary | ICD-10-CM

## 2024-05-28 DIAGNOSIS — Z80.3 FAMILY HISTORY OF BREAST CANCER: ICD-10-CM

## 2024-05-28 LAB — LAB RESULT: NORMAL

## 2024-05-28 PROCEDURE — 96040 HB GENETIC COUNSELING EACH 30 MIN: CPT | Performed by: GENETIC COUNSELOR, MS

## 2024-06-14 ENCOUNTER — TELEPHONE (OUTPATIENT)
Dept: MATERNAL FETAL MEDICINE | Age: 39
End: 2024-06-14

## 2025-04-07 ENCOUNTER — APPOINTMENT (OUTPATIENT)
Dept: OBGYN | Age: 40
End: 2025-04-07

## 2025-04-07 VITALS
DIASTOLIC BLOOD PRESSURE: 73 MMHG | WEIGHT: 142.5 LBS | RESPIRATION RATE: 16 BRPM | BODY MASS INDEX: 25.25 KG/M2 | HEIGHT: 63 IN | SYSTOLIC BLOOD PRESSURE: 107 MMHG | HEART RATE: 65 BPM

## 2025-04-07 DIAGNOSIS — Z12.31 ENCOUNTER FOR SCREENING MAMMOGRAM FOR MALIGNANT NEOPLASM OF BREAST: Primary | ICD-10-CM

## 2025-04-07 DIAGNOSIS — Z01.419 WELL WOMAN EXAM WITH ROUTINE GYNECOLOGICAL EXAM: ICD-10-CM

## 2025-04-07 DIAGNOSIS — R92.30 DENSE BREAST: ICD-10-CM

## 2025-04-07 PROCEDURE — 88175 CYTOPATH C/V AUTO FLUID REDO: CPT | Performed by: CLINICAL MEDICAL LABORATORY

## 2025-04-07 PROCEDURE — 99396 PREV VISIT EST AGE 40-64: CPT | Performed by: OBSTETRICS & GYNECOLOGY

## 2025-04-07 PROCEDURE — 87624 HPV HI-RISK TYP POOLED RSLT: CPT | Performed by: CLINICAL MEDICAL LABORATORY

## 2025-04-07 RX ORDER — ROSUVASTATIN CALCIUM 10 MG/1
10 TABLET, COATED ORAL
COMMUNITY
Start: 2024-10-25

## 2025-04-07 RX ORDER — SEMAGLUTIDE 2.4 MG/.75ML
INJECTION, SOLUTION SUBCUTANEOUS
COMMUNITY

## 2025-04-07 ASSESSMENT — PAIN SCALES - GENERAL: PAINLEVEL: 0

## 2025-04-11 LAB
CASE RPRT: NORMAL
CYTOLOGY CVX/VAG STUDY: NORMAL
HPV16+18+45 E6+E7MRNA CVX NAA+PROBE: NEGATIVE
PAP EDUCATIONAL NOTE: NORMAL
SERVICE CMNT-IMP: NORMAL
STAT OF ADQ CVX/VAG CYTO-IMP: NORMAL

## 2025-04-17 ENCOUNTER — E-ADVICE (OUTPATIENT)
Dept: OBGYN | Age: 40
End: 2025-04-17

## 2025-05-05 ENCOUNTER — RESULTS FOLLOW-UP (OUTPATIENT)
Dept: OBGYN | Age: 40
End: 2025-05-05

## 2025-05-05 ENCOUNTER — APPOINTMENT (OUTPATIENT)
Dept: MAMMOGRAPHY | Age: 40
End: 2025-05-05

## 2025-05-05 DIAGNOSIS — Z12.31 ENCOUNTER FOR SCREENING MAMMOGRAM FOR MALIGNANT NEOPLASM OF BREAST: ICD-10-CM

## 2025-05-05 DIAGNOSIS — N64.89 BREAST ASYMMETRY: Primary | ICD-10-CM

## 2025-05-05 PROCEDURE — 77067 SCR MAMMO BI INCL CAD: CPT

## 2025-05-23 ENCOUNTER — APPOINTMENT (OUTPATIENT)
Dept: MAMMOGRAPHY | Age: 40
End: 2025-05-23
Attending: OBSTETRICS & GYNECOLOGY

## 2025-06-18 ENCOUNTER — HOSPITAL ENCOUNTER (OUTPATIENT)
Dept: MAMMOGRAPHY | Age: 40
Discharge: HOME | End: 2025-06-18
Attending: OBSTETRICS & GYNECOLOGY

## 2025-06-18 DIAGNOSIS — N64.89 BREAST ASYMMETRY: ICD-10-CM

## 2025-06-18 PROCEDURE — 77066 DX MAMMO INCL CAD BI: CPT

## 2025-06-18 PROCEDURE — 76641 ULTRASOUND BREAST COMPLETE: CPT

## 2025-06-19 ENCOUNTER — RESULTS FOLLOW-UP (OUTPATIENT)
Dept: OBGYN | Age: 40
End: 2025-06-19

## 2025-06-30 ENCOUNTER — APPOINTMENT (OUTPATIENT)
Dept: MAMMOGRAPHY | Age: 40
End: 2025-06-30
Attending: OBSTETRICS & GYNECOLOGY

## 2025-07-21 NOTE — PATIENT PROFILE OB - URINARY CATHETER
--DO NOT REPLY - Sent from PACT - If sent to wrong pool, reroute to P ECO Reroute pool --    General Message: Patient is returning phone call. Patient connected to the office.  Callback #: [phone]  Can a detailed message be left? Yes - Voicemail   Caller has been advised this message will be addressed within:2-3 business days [routine]         no

## (undated) DEVICE — PENCIL SMKEVC 10FT 38IN PLUMEPEN ELITE 78IN ULSLM NONSTICK

## (undated) DEVICE — ADHESIVE LIQUID LF WTPRF VIAL PREP NONSTAIN MASTISOL STYRAX

## (undated) DEVICE — ELECTRODE ESURG BLADE 2.75IN .2IN 332IN INSULATE STD SHAFT

## (undated) DEVICE — SUTURE MONOCRYL MTPS 4-0 PS2 18IN MONO ABS UNDYED

## (undated) DEVICE — DRAPE REINF FAN FOLD 77X56IN 42X25IN SURG CNVRT ASTOUND STRL

## (undated) DEVICE — NEEDLE HPO 23GA 1.5IN THNWL REG BVL STRL LF DISP SFGLD

## (undated) DEVICE — POSITIONER PSTN 9IN DONUT HEAD FOAM

## (undated) DEVICE — STRIP 4X.5IN STRSTRP POLY REINFORCE SKNCLS WHT STRL LF

## (undated) DEVICE — BLANKET WRM LWR BODY ADULT 60X36IN BR HGR PLMR FT DRAPE ADH

## (undated) DEVICE — GLOVE SURG 5.5 PROTEXIS PI MIC LF CRM PF SMTH BEAD CUFF STRL

## (undated) DEVICE — POSITIONER OR ULN NRV FOAM CONVOLUTE

## (undated) DEVICE — GLOVE SURG 7.5 PROTEXIS LF BLUE PF SMTH BEAD CUFF INTLK STRL

## (undated) DEVICE — GLOVE SURG 7.5 PROTEXIS PI MIC LF CRM PF SMTH BEAD CUFF STRL

## (undated) DEVICE — SUTURE VICRYL 2-0 SH 27IN BRAID COAT ABS UNDYED J417H

## (undated) DEVICE — CONTAINER SPEC 4OZ 2.5X2.75IN OR POS INDCTR TMPR EVD LEAK

## (undated) DEVICE — SUTURE VICRYL 3-0 SH 27IN BRAID COAT ABS UNDYED J416H

## (undated) DEVICE — CRADLE PSTN DEVON ARM FOAM LMI LF

## (undated) DEVICE — Device

## (undated) DEVICE — TOWEL OR BLU 16X26IN 4PK

## (undated) DEVICE — DRESSING TRANS 4.75X4IN ADH HPOAL WTPRF TEGADERM PU STD STRL

## (undated) DEVICE — ELECTRODE PT RTN C30- LB 9FT CORD NONIRRITATE NONSENSITIZE

## (undated) DEVICE — BANDAGE GAUZE BLK2 3.6YDX3.4IN 6 PLY ABS STRCH TIGHT FNSH

## (undated) DEVICE — SPONGE GAUZE 4X4IN CTN 12 PLY WOVEN FOLD EDGE STRL LF

## (undated) DEVICE — SUTURE PRMHND 2-0 SH 30IN SILK BRAID NABSB BLK K833H

## (undated) DEVICE — GLOVE SURG 6 PROTEXIS LF BLUE PF SMTH BEAD CUFF INTLK STRL

## (undated) DEVICE — BAND SURG 3X18IN RBR STRL

## (undated) DEVICE — PEN SURGMRK DEVON SKIN DISP NONSMEAR REG TIP FLXB RULER

## (undated) DEVICE — GLOVE SURG 8 PROTEXIS LF BLUE PF SMTH BEAD CUFF INTLK STRL

## (undated) DEVICE — SYRINGE 10ML CNTRL CONC TIP PYROGEN FREE DEHP-FR STRL MED LF

## (undated) DEVICE — SHEATH SCOUT